# Patient Record
Sex: FEMALE | Race: WHITE | NOT HISPANIC OR LATINO
[De-identification: names, ages, dates, MRNs, and addresses within clinical notes are randomized per-mention and may not be internally consistent; named-entity substitution may affect disease eponyms.]

---

## 2019-11-12 ENCOUNTER — APPOINTMENT (OUTPATIENT)
Dept: INTERNAL MEDICINE | Facility: CLINIC | Age: 79
End: 2019-11-12
Payer: MEDICARE

## 2019-11-12 VITALS
TEMPERATURE: 97.1 F | DIASTOLIC BLOOD PRESSURE: 80 MMHG | BODY MASS INDEX: 25.87 KG/M2 | HEIGHT: 63 IN | HEART RATE: 86 BPM | WEIGHT: 146 LBS | SYSTOLIC BLOOD PRESSURE: 150 MMHG | OXYGEN SATURATION: 99 %

## 2019-11-12 VITALS — DIASTOLIC BLOOD PRESSURE: 90 MMHG | SYSTOLIC BLOOD PRESSURE: 160 MMHG

## 2019-11-12 DIAGNOSIS — R01.1 CARDIAC MURMUR, UNSPECIFIED: ICD-10-CM

## 2019-11-12 DIAGNOSIS — R68.89 OTHER GENERAL SYMPTOMS AND SIGNS: ICD-10-CM

## 2019-11-12 DIAGNOSIS — R35.1 NOCTURIA: ICD-10-CM

## 2019-11-12 DIAGNOSIS — Z82.49 FAMILY HISTORY OF ISCHEMIC HEART DISEASE AND OTHER DISEASES OF THE CIRCULATORY SYSTEM: ICD-10-CM

## 2019-11-12 DIAGNOSIS — Z00.00 ENCOUNTER FOR GENERAL ADULT MEDICAL EXAMINATION W/OUT ABNORMAL FINDINGS: ICD-10-CM

## 2019-11-12 DIAGNOSIS — R09.82 POSTNASAL DRIP: ICD-10-CM

## 2019-11-12 DIAGNOSIS — Z23 ENCOUNTER FOR IMMUNIZATION: ICD-10-CM

## 2019-11-12 DIAGNOSIS — R92.2 INCONCLUSIVE MAMMOGRAM: ICD-10-CM

## 2019-11-12 DIAGNOSIS — Z12.31 ENCOUNTER FOR SCREENING MAMMOGRAM FOR MALIGNANT NEOPLASM OF BREAST: ICD-10-CM

## 2019-11-12 DIAGNOSIS — Z01.419 ENCOUNTER FOR GYNECOLOGICAL EXAMINATION (GENERAL) (ROUTINE) W/OUT ABNORMAL FINDINGS: ICD-10-CM

## 2019-11-12 PROCEDURE — G0009: CPT

## 2019-11-12 PROCEDURE — 93000 ELECTROCARDIOGRAM COMPLETE: CPT

## 2019-11-12 PROCEDURE — 99214 OFFICE O/P EST MOD 30 MIN: CPT | Mod: 25

## 2019-11-12 PROCEDURE — 90670 PCV13 VACCINE IM: CPT

## 2019-11-12 PROCEDURE — G0438: CPT

## 2019-11-12 PROCEDURE — 36415 COLL VENOUS BLD VENIPUNCTURE: CPT

## 2019-11-12 PROCEDURE — 90472 IMMUNIZATION ADMIN EACH ADD: CPT | Mod: GY

## 2019-11-12 PROCEDURE — 90715 TDAP VACCINE 7 YRS/> IM: CPT | Mod: GY

## 2019-11-12 NOTE — PHYSICAL EXAM
[No Acute Distress] : no acute distress [Well Developed] : well developed [Well Nourished] : well nourished [Normal Sclera/Conjunctiva] : normal sclera/conjunctiva [Well-Appearing] : well-appearing [PERRL] : pupils equal round and reactive to light [Normal Outer Ear/Nose] : the outer ears and nose were normal in appearance [Normal TMs] : both tympanic membranes were normal [Normal Oropharynx] : the oropharynx was normal [Thyroid Normal, No Nodules] : the thyroid was normal and there were no nodules present [No Lymphadenopathy] : no lymphadenopathy [Supple] : supple [No Accessory Muscle Use] : no accessory muscle use [No Respiratory Distress] : no respiratory distress  [Normal S1, S2] : normal S1 and S2 [Regular Rhythm] : with a regular rhythm [Clear to Auscultation] : lungs were clear to auscultation bilaterally [Normal Rate] : normal rate  [No Carotid Bruits] : no carotid bruits [No Edema] : there was no peripheral edema [Pedal Pulses Present] : the pedal pulses are present [Non Tender] : non-tender [Soft] : abdomen soft [Non-distended] : non-distended [Normal Bowel Sounds] : normal bowel sounds [Normal Supraclavicular Nodes] : no supraclavicular lymphadenopathy [Normal Axillary Nodes] : no axillary lymphadenopathy [Normal Posterior Cervical Nodes] : no posterior cervical lymphadenopathy [Normal Inguinal Nodes] : no inguinal lymphadenopathy [Normal Anterior Cervical Nodes] : no anterior cervical lymphadenopathy [Grossly Normal Strength/Tone] : grossly normal strength/tone [No Focal Deficits] : no focal deficits [Normal Affect] : the affect was normal [Normal Gait] : normal gait [Normal Insight/Judgement] : insight and judgment were intact [Normal Appearance] : normal in appearance [No Masses] : no palpable masses [No Axillary Lymphadenopathy] : no axillary lymphadenopathy [de-identified] : murmur [de-identified] : chronically tender breast [de-identified] : heberden's nodes

## 2019-11-12 NOTE — HISTORY OF PRESENT ILLNESS
[de-identified] : vaccine- pt doesn't want flu vaccine.  pt has no vaccination rec.  rec shingrix vaccine.  pt doesn't recall last tdap vaccine\par diet-non compliant\par exercise-no\par HTN-     She  is not taking med. - last taken it 1 yr ago\par  She  has no CP or SOB.\par tinnitus- R ear- 15 yr- pt was seen by ENT]\par adopted daughter-  in MVA\par depression- pt seeing psych\par pt c/o heat intolerance\par nocturia- every 2 hr\par  pt states she was seen by DERM for wart on her scalp- pt is being tx\par balance- pt was tx w vestibular tx by ENT-  now no symptoms\par finger pain- 1 yr- no daily- worse w cold temp\par post nasal drip- sev yrs.  more last yr. no meds taken.  no itchy eyes, nose or sneezing.\par GERD- once every 2-3 wk.  started 6 mo ago.  no abd pain, dysphagia, wgt loss, melena. not taking any meds. chg from coffee to tea over past 2-3 wk.  prev drank 2 cup coffee/ day\par chol- Patient is not taking medications.  She  non compliant w diet\par \par osteopenia as per pt [FreeTextEntry1] : CPE

## 2019-11-12 NOTE — PLAN
[FreeTextEntry1] : EKG-NSR 74\par HTN- rec pt speak to her psych about going off adderall.  start norvasc\par finger pain- OA\par GERD- make life style chg and use zantac- if persistent- will do EGD

## 2019-11-12 NOTE — HEALTH RISK ASSESSMENT
[Very Good] : ~his/her~ current health as very good [Fair] :  ~his/her~ mood as fair [Yes] : Yes [Never (0 pts)] : Never (0 points) [1 or 2 (0 pts)] : 1 or 2 (0 points) [2 - 4 times a month (2 pts)] : 2-4 times a month (2 points) [No] : In the past 12 months have you used drugs other than those required for medical reasons? No [No falls in past year] : Patient reported no falls in the past year [0] : 2) Feeling down, depressed, or hopeless: Not at all (0) [Patient reported mammogram was normal] : Patient reported mammogram was normal [Patient reported colonoscopy was normal] : Patient reported colonoscopy was normal [Alone] : lives alone [HIV test declined] : HIV test declined [Fully functional (bathing, dressing, toileting, transferring, walking, feeding)] : Fully functional (bathing, dressing, toileting, transferring, walking, feeding) [] :  [Fully functional (using the telephone, shopping, preparing meals, housekeeping, doing laundry, using] : Fully functional and needs no help or supervision to perform IADLs (using the telephone, shopping, preparing meals, housekeeping, doing laundry, using transportation, managing medications and managing finances) [With Patient/Caregiver] : With Patient/Caregiver [Audit-CScore] : 2 [] : No [Reports changes in hearing] : Reports no changes in hearing [Change in mental status noted] : No change in mental status noted [MammogramDate] : 2015 [ColonoscopyDate] : 2011 [ColonoscopyComments] : pt states she was told to rpt in 10 yr- pt to release copy  [de-identified] : last seen ophth- last wk [de-identified] : pt seen dentist very frequently [AdvancecareDate] : 11/19

## 2019-11-13 ENCOUNTER — CLINICAL ADVICE (OUTPATIENT)
Age: 79
End: 2019-11-13

## 2019-11-15 ENCOUNTER — RESULT REVIEW (OUTPATIENT)
Age: 79
End: 2019-11-15

## 2019-11-15 ENCOUNTER — RESULT CHARGE (OUTPATIENT)
Age: 79
End: 2019-11-15

## 2019-11-15 LAB
25(OH)D3 SERPL-MCNC: 51.1 NG/ML
ALBUMIN SERPL ELPH-MCNC: 4.5 G/DL
ALP BLD-CCNC: 80 U/L
ALT SERPL-CCNC: 16 U/L
ANION GAP SERPL CALC-SCNC: 14 MMOL/L
APPEARANCE: ABNORMAL
AST SERPL-CCNC: 26 U/L
BACTERIA: NEGATIVE
BASOPHILS # BLD AUTO: 0.05 K/UL
BASOPHILS NFR BLD AUTO: 0.5 %
BILIRUB SERPL-MCNC: 0.2 MG/DL
BILIRUBIN URINE: NEGATIVE
BLOOD URINE: NEGATIVE
BUN SERPL-MCNC: 20 MG/DL
CALCIUM OXALATE CRYSTALS: ABNORMAL
CALCIUM SERPL-MCNC: 10.7 MG/DL
CHLORIDE SERPL-SCNC: 100 MMOL/L
CHOLEST SERPL-MCNC: 354 MG/DL
CHOLEST/HDLC SERPL: 8.2 RATIO
CO2 SERPL-SCNC: 26 MMOL/L
COLOR: ABNORMAL
CREAT SERPL-MCNC: 1.13 MG/DL
EOSINOPHIL # BLD AUTO: 0.4 K/UL
EOSINOPHIL NFR BLD AUTO: 4.2 %
ESTIMATED AVERAGE GLUCOSE: 120 MG/DL
GLUCOSE QUALITATIVE U: NEGATIVE
GLUCOSE SERPL-MCNC: 123 MG/DL
HBA1C MFR BLD HPLC: 5.8 %
HCT VFR BLD CALC: 45.9 %
HDLC SERPL-MCNC: 43 MG/DL
HGB BLD-MCNC: 13.8 G/DL
HYALINE CASTS: 0 /LPF
IMM GRANULOCYTES NFR BLD AUTO: 0.7 %
KETONES URINE: NEGATIVE
LDLC SERPL CALC-MCNC: 243 MG/DL
LEUKOCYTE ESTERASE URINE: NEGATIVE
LYMPHOCYTES # BLD AUTO: 2.44 K/UL
LYMPHOCYTES NFR BLD AUTO: 25.8 %
MAN DIFF?: NORMAL
MCHC RBC-ENTMCNC: 27.1 PG
MCHC RBC-ENTMCNC: 30.1 GM/DL
MCV RBC AUTO: 90.2 FL
MICROSCOPIC-UA: NORMAL
MONOCYTES # BLD AUTO: 0.77 K/UL
MONOCYTES NFR BLD AUTO: 8.1 %
NEUTROPHILS # BLD AUTO: 5.72 K/UL
NEUTROPHILS NFR BLD AUTO: 60.7 %
NITRITE URINE: NEGATIVE
PH URINE: 5.5
PLATELET # BLD AUTO: 283 K/UL
POTASSIUM SERPL-SCNC: 4.9 MMOL/L
PROT SERPL-MCNC: 7.8 G/DL
PROTEIN URINE: ABNORMAL
RBC # BLD: 5.09 M/UL
RBC # FLD: 14.4 %
RED BLOOD CELLS URINE: 0 /HPF
SAVE SPECIMEN: NORMAL
SODIUM SERPL-SCNC: 140 MMOL/L
SPECIFIC GRAVITY URINE: 1.03
SQUAMOUS EPITHELIAL CELLS: 1 /HPF
T3 SERPL-MCNC: 126 NG/DL
TRIGL SERPL-MCNC: 342 MG/DL
TSH SERPL-ACNC: 2.69 UIU/ML
URINE COMMENTS: NORMAL
UROBILINOGEN URINE: NORMAL
WBC # FLD AUTO: 9.45 K/UL
WHITE BLOOD CELLS URINE: 2 /HPF

## 2019-12-03 ENCOUNTER — APPOINTMENT (OUTPATIENT)
Dept: INTERNAL MEDICINE | Facility: CLINIC | Age: 79
End: 2019-12-03
Payer: MEDICARE

## 2019-12-03 ENCOUNTER — RX RENEWAL (OUTPATIENT)
Age: 79
End: 2019-12-03

## 2019-12-03 VITALS
WEIGHT: 148 LBS | TEMPERATURE: 99.4 F | BODY MASS INDEX: 26.22 KG/M2 | HEART RATE: 95 BPM | DIASTOLIC BLOOD PRESSURE: 68 MMHG | OXYGEN SATURATION: 98 % | HEIGHT: 63 IN | SYSTOLIC BLOOD PRESSURE: 146 MMHG

## 2019-12-03 VITALS — SYSTOLIC BLOOD PRESSURE: 140 MMHG | DIASTOLIC BLOOD PRESSURE: 80 MMHG

## 2019-12-03 DIAGNOSIS — K80.20 CALCULUS OF GALLBLADDER W/OUT CHOLECYSTITIS W/OUT OBSTRUCTION: ICD-10-CM

## 2019-12-03 DIAGNOSIS — K76.0 FATTY (CHANGE OF) LIVER, NOT ELSEWHERE CLASSIFIED: ICD-10-CM

## 2019-12-03 DIAGNOSIS — Z23 ENCOUNTER FOR IMMUNIZATION: ICD-10-CM

## 2019-12-03 DIAGNOSIS — K21.9 GASTRO-ESOPHAGEAL REFLUX DISEASE W/OUT ESOPHAGITIS: ICD-10-CM

## 2019-12-03 DIAGNOSIS — N64.4 MASTODYNIA: ICD-10-CM

## 2019-12-03 DIAGNOSIS — R73.09 OTHER ABNORMAL GLUCOSE: ICD-10-CM

## 2019-12-03 PROCEDURE — 36415 COLL VENOUS BLD VENIPUNCTURE: CPT

## 2019-12-03 PROCEDURE — 99214 OFFICE O/P EST MOD 30 MIN: CPT | Mod: 25

## 2019-12-03 RX ORDER — DEXTROAMPHETAMINE SACCHARATE, AMPHETAMINE ASPARTATE, DEXTROAMPHETAMINE SULFATE AND AMPHETAMINE SULFATE 5; 5; 5; 5 MG/1; MG/1; MG/1; MG/1
20 TABLET ORAL
Qty: 60 | Refills: 0 | Status: DISCONTINUED | COMMUNITY
Start: 2019-06-05 | End: 2019-12-03

## 2019-12-03 RX ORDER — AMLODIPINE BESYLATE 5 MG/1
5 TABLET ORAL
Qty: 30 | Refills: 0 | Status: DISCONTINUED | COMMUNITY
Start: 2019-11-12 | End: 2019-12-03

## 2019-12-03 NOTE — PLAN
[FreeTextEntry1] : lipid- rec starting statin- SE of meds discussed.  low chol diet reviewed w pt.  pt states she had myalgia w statin prev but pt doesn't know which statin she is used in the past.\par \par HTN- incr norvasc 2.5mg\par IGT- diet reviewed w pt.\par pt to f/u w appt

## 2019-12-03 NOTE — REVIEW OF SYSTEMS
[Chest Pain] : no chest pain [Shortness Of Breath] : no shortness of breath [FreeTextEntry1] : see hPi

## 2019-12-03 NOTE — HISTORY OF PRESENT ILLNESS
[FreeTextEntry1] : HTN, chol [de-identified] : HTN- last visit norvasc was started. rec pt speak to psych to dec alderal- pt instead stopped taking the medication and feels better.    She  is  compliant with med. \par   Patient is not compliant with diet. Ms. KENNEY  exercises []. \par She  has no CP or SOB.\par \par GERD-pt didn't start rantidine. pt gets symptoms once a mo\par IGT- reviewed diet\par Mastalgia- pt was told to f/u w GYN, mammo, US\par lipid- reviewed diet and tx w pt\par

## 2019-12-04 LAB
CREAT SPEC-SCNC: 287 MG/DL
CREAT/PROT UR: 0.2 RATIO
PROT UR-MCNC: 42 MG/DL

## 2019-12-11 ENCOUNTER — APPOINTMENT (OUTPATIENT)
Dept: INTERNAL MEDICINE | Facility: CLINIC | Age: 79
End: 2019-12-11
Payer: MEDICARE

## 2019-12-11 PROCEDURE — 77080 DXA BONE DENSITY AXIAL: CPT | Mod: GA

## 2019-12-25 ENCOUNTER — TRANSCRIPTION ENCOUNTER (OUTPATIENT)
Age: 79
End: 2019-12-25

## 2020-01-02 ENCOUNTER — RX RENEWAL (OUTPATIENT)
Age: 80
End: 2020-01-02

## 2020-01-07 ENCOUNTER — APPOINTMENT (OUTPATIENT)
Dept: INTERNAL MEDICINE | Facility: CLINIC | Age: 80
End: 2020-01-07
Payer: MEDICARE

## 2020-01-07 ENCOUNTER — RX RENEWAL (OUTPATIENT)
Age: 80
End: 2020-01-07

## 2020-01-07 VITALS
WEIGHT: 146 LBS | HEART RATE: 94 BPM | TEMPERATURE: 98.3 F | SYSTOLIC BLOOD PRESSURE: 150 MMHG | BODY MASS INDEX: 25.87 KG/M2 | DIASTOLIC BLOOD PRESSURE: 74 MMHG | OXYGEN SATURATION: 98 % | HEIGHT: 63 IN

## 2020-01-07 VITALS — SYSTOLIC BLOOD PRESSURE: 142 MMHG | DIASTOLIC BLOOD PRESSURE: 76 MMHG

## 2020-01-07 VITALS — DIASTOLIC BLOOD PRESSURE: 78 MMHG | SYSTOLIC BLOOD PRESSURE: 148 MMHG

## 2020-01-07 DIAGNOSIS — R07.89 OTHER CHEST PAIN: ICD-10-CM

## 2020-01-07 PROCEDURE — 93000 ELECTROCARDIOGRAM COMPLETE: CPT

## 2020-01-07 PROCEDURE — 99214 OFFICE O/P EST MOD 30 MIN: CPT | Mod: 25

## 2020-01-07 PROCEDURE — 36415 COLL VENOUS BLD VENIPUNCTURE: CPT

## 2020-01-07 RX ORDER — AMLODIPINE BESYLATE 2.5 MG/1
2.5 TABLET ORAL DAILY
Qty: 30 | Refills: 0 | Status: DISCONTINUED | COMMUNITY
Start: 2019-12-03 | End: 2020-01-07

## 2020-01-07 RX ORDER — AMLODIPINE BESYLATE 5 MG/1
5 TABLET ORAL
Qty: 30 | Refills: 0 | Status: DISCONTINUED | COMMUNITY
Start: 2019-12-03 | End: 2020-01-07

## 2020-01-07 NOTE — PLAN
[FreeTextEntry1] : EKG-0 NSR 83.  no chg.  \par diet reviewed. \par non fasting labs. \par HTN- incr norvasc.

## 2020-01-07 NOTE — HISTORY OF PRESENT ILLNESS
[FreeTextEntry1] : chol [de-identified] : HTN-     She  is  compliant with med. \par   Patient is compliant with diet. Ms. KENNEY doesn't exercises [].   norvasc was incr last visit. \par She  has no SOB. \par CP on rest-  when laying down.  3-4 episodes- started 1 mo ago.  last episode - 1 wk.  duration less than 1 min.  no dyspnea. mild dull CP.  no alleviating or aggrav factors. \par   no GERD symptoms\par \par chol- Patient is compliant with diet and medications.  She  has no abdominal pain,  and myalgia\par crestor was started last visit. \par IGT- diet discussed\par pt c/o dec motivation.  occasional sad.  no hopelessness\par

## 2020-01-09 LAB
ALBUMIN SERPL ELPH-MCNC: 4.7 G/DL
ALP BLD-CCNC: 66 U/L
ALT SERPL-CCNC: 16 U/L
ANION GAP SERPL CALC-SCNC: 13 MMOL/L
AST SERPL-CCNC: 24 U/L
BILIRUB SERPL-MCNC: 0.2 MG/DL
BUN SERPL-MCNC: 21 MG/DL
CALCIUM SERPL-MCNC: 10.4 MG/DL
CHLORIDE SERPL-SCNC: 102 MMOL/L
CO2 SERPL-SCNC: 25 MMOL/L
CREAT SERPL-MCNC: 1.05 MG/DL
ESTIMATED AVERAGE GLUCOSE: 123 MG/DL
GLUCOSE SERPL-MCNC: 117 MG/DL
HBA1C MFR BLD HPLC: 5.9 %
POTASSIUM SERPL-SCNC: 4.5 MMOL/L
PROT SERPL-MCNC: 8 G/DL
SODIUM SERPL-SCNC: 140 MMOL/L

## 2020-02-05 ENCOUNTER — RX RENEWAL (OUTPATIENT)
Age: 80
End: 2020-02-05

## 2020-02-05 ENCOUNTER — APPOINTMENT (OUTPATIENT)
Dept: INTERNAL MEDICINE | Facility: CLINIC | Age: 80
End: 2020-02-05
Payer: MEDICARE

## 2020-02-05 VITALS
SYSTOLIC BLOOD PRESSURE: 150 MMHG | HEIGHT: 63 IN | OXYGEN SATURATION: 98 % | BODY MASS INDEX: 25.87 KG/M2 | TEMPERATURE: 98.1 F | WEIGHT: 146 LBS | HEART RATE: 80 BPM | DIASTOLIC BLOOD PRESSURE: 70 MMHG

## 2020-02-05 DIAGNOSIS — M81.0 AGE-RELATED OSTEOPOROSIS W/OUT CURRENT PATHOLOGICAL FRACTURE: ICD-10-CM

## 2020-02-05 DIAGNOSIS — Z86.39 PERSONAL HISTORY OF OTHER ENDOCRINE, NUTRITIONAL AND METABOLIC DISEASE: ICD-10-CM

## 2020-02-05 PROCEDURE — 99214 OFFICE O/P EST MOD 30 MIN: CPT | Mod: 25

## 2020-02-05 PROCEDURE — 93306 TTE W/DOPPLER COMPLETE: CPT | Mod: TC

## 2020-02-05 PROCEDURE — 93306 TTE W/DOPPLER COMPLETE: CPT | Mod: 26

## 2020-02-05 RX ORDER — HYDROCHLOROTHIAZIDE 12.5 MG/1
12.5 CAPSULE ORAL
Qty: 30 | Refills: 0 | Status: DISCONTINUED | COMMUNITY
Start: 2020-02-05 | End: 2020-02-05

## 2020-02-05 RX ORDER — DICLOFENAC SODIUM 10 MG/G
1 GEL TOPICAL DAILY
Qty: 1 | Refills: 3 | Status: ACTIVE | COMMUNITY
Start: 2020-02-05 | End: 1900-01-01

## 2020-02-05 NOTE — HISTORY OF PRESENT ILLNESS
[FreeTextEntry1] : HTN [de-identified] : HTN-     She  ist  compliant with med. incr norvasc last visit.  pt has more HA over past few wk\par   Patient is compliant with diet. Ms. KENNEY  ordered an exercise bike\par She  has no CP or SOB.\par CHOL- pt stopped statin b/c she started abt.  Patient is compliant with diet.  She  has no abdominal pain,  \par has shoulder pain- started 2 wk ago- constant daily pain. moderate dull pain\par better w aspercream.  worse w lifting or putting on her coat or jacket.\par no limitation of ROM.  no weakness or numbness of the UExt.  no trauma \par prelim ECHOmild AI, AS, MR, TR, EF 67%\par osteopenia- discussed Ca, vit D supplement\par \par \par

## 2020-02-05 NOTE — PLAN
[FreeTextEntry1] : chol- hold crestor for another wk and see if shoulder painresolved\par shoulder pain- heat.  avoid heavy lifting. try voltaren\par HTN- dec norvasc  and add lisinopril\par rec shingrix vac\par osteopenia- Ca, vit D, exercise

## 2020-02-05 NOTE — PHYSICAL EXAM
[de-identified] : MS 5/5 UExt.  good ROM of b/l shoulder- pain at 90 deg flexion. mild discomfort on internal rotation

## 2020-03-06 ENCOUNTER — RX RENEWAL (OUTPATIENT)
Age: 80
End: 2020-03-06

## 2020-03-06 ENCOUNTER — APPOINTMENT (OUTPATIENT)
Dept: INTERNAL MEDICINE | Facility: CLINIC | Age: 80
End: 2020-03-06
Payer: MEDICARE

## 2020-03-06 VITALS
HEART RATE: 90 BPM | OXYGEN SATURATION: 98 % | WEIGHT: 148 LBS | TEMPERATURE: 98.1 F | HEIGHT: 63 IN | DIASTOLIC BLOOD PRESSURE: 70 MMHG | BODY MASS INDEX: 26.22 KG/M2 | SYSTOLIC BLOOD PRESSURE: 160 MMHG

## 2020-03-06 VITALS — SYSTOLIC BLOOD PRESSURE: 160 MMHG | DIASTOLIC BLOOD PRESSURE: 80 MMHG

## 2020-03-06 DIAGNOSIS — M25.512 PAIN IN RIGHT SHOULDER: ICD-10-CM

## 2020-03-06 DIAGNOSIS — I35.0 NONRHEUMATIC AORTIC (VALVE) STENOSIS: ICD-10-CM

## 2020-03-06 DIAGNOSIS — M25.511 PAIN IN RIGHT SHOULDER: ICD-10-CM

## 2020-03-06 LAB
ANION GAP SERPL CALC-SCNC: 13 MMOL/L
BUN SERPL-MCNC: 17 MG/DL
CALCIUM SERPL-MCNC: 10.1 MG/DL
CHLORIDE SERPL-SCNC: 100 MMOL/L
CO2 SERPL-SCNC: 27 MMOL/L
CREAT SERPL-MCNC: 0.99 MG/DL
GLUCOSE SERPL-MCNC: 110 MG/DL
POTASSIUM SERPL-SCNC: 4.6 MMOL/L
SODIUM SERPL-SCNC: 139 MMOL/L

## 2020-03-06 PROCEDURE — 99214 OFFICE O/P EST MOD 30 MIN: CPT | Mod: 25

## 2020-03-06 PROCEDURE — 36415 COLL VENOUS BLD VENIPUNCTURE: CPT

## 2020-03-06 RX ORDER — CLARITHROMYCIN 500 MG/1
500 TABLET, FILM COATED ORAL
Qty: 14 | Refills: 0 | Status: DISCONTINUED | COMMUNITY
Start: 2019-06-27 | End: 2020-03-06

## 2020-03-06 RX ORDER — RANITIDINE 150 MG/1
150 TABLET, FILM COATED ORAL
Qty: 60 | Refills: 0 | Status: DISCONTINUED | COMMUNITY
Start: 2019-11-12 | End: 2020-03-06

## 2020-03-06 RX ORDER — CHLORHEXIDINE GLUCONATE, 0.12% ORAL RINSE 1.2 MG/ML
0.12 SOLUTION DENTAL
Qty: 473 | Refills: 0 | Status: DISCONTINUED | COMMUNITY
Start: 2019-06-27 | End: 2020-03-06

## 2020-03-06 NOTE — HISTORY OF PRESENT ILLNESS
[FreeTextEntry1] : HTN [de-identified] : HTN-     She  is  compliant with med. - norvasc was decr.  added lisinopril last visit\par   Patient is compliant with diet. Ms. KENNEY  exercises -no\par She  has no CP or SOB.\par \par CHol- Patient stopped statin due to shoulder pain.  She  has no abdominal pain,  and myalgia\par \par \par shoulder pain- resolved after stopping statin 1 wk

## 2020-03-10 ENCOUNTER — RX RENEWAL (OUTPATIENT)
Age: 80
End: 2020-03-10

## 2020-03-10 RX ORDER — AMLODIPINE BESYLATE 5 MG/1
5 TABLET ORAL
Qty: 90 | Refills: 0 | Status: ACTIVE | COMMUNITY
Start: 2020-02-05 | End: 1900-01-01

## 2020-03-30 ENCOUNTER — APPOINTMENT (OUTPATIENT)
Dept: INTERNAL MEDICINE | Facility: CLINIC | Age: 80
End: 2020-03-30

## 2020-03-31 ENCOUNTER — RX RENEWAL (OUTPATIENT)
Age: 80
End: 2020-03-31

## 2020-05-28 ENCOUNTER — RX RENEWAL (OUTPATIENT)
Age: 80
End: 2020-05-28

## 2020-06-01 ENCOUNTER — APPOINTMENT (OUTPATIENT)
Dept: INTERNAL MEDICINE | Facility: CLINIC | Age: 80
End: 2020-06-01
Payer: MEDICARE

## 2020-06-01 PROCEDURE — 99442: CPT | Mod: 95

## 2020-06-01 RX ORDER — LISINOPRIL 5 MG/1
5 TABLET ORAL DAILY
Qty: 30 | Refills: 0 | Status: DISCONTINUED | COMMUNITY
Start: 2020-02-05 | End: 2020-06-01

## 2020-06-01 RX ORDER — TRIAMCINOLONE ACETONIDE 55 UG/1
55 SPRAY, METERED NASAL
Qty: 1 | Refills: 0 | Status: DISCONTINUED | COMMUNITY
Start: 2019-11-12 | End: 2020-06-01

## 2020-06-01 RX ORDER — CANE TIPS
EACH MISCELLANEOUS
Qty: 1 | Refills: 0 | Status: ACTIVE | COMMUNITY
Start: 2020-06-01 | End: 1900-01-01

## 2020-06-01 NOTE — ASSESSMENT
[FreeTextEntry1] : HTN=-  pt will get BP machine and send me reading\par pt to try to walk daily.\par 15   minutes minimal was spent with patient and >50% of the time spent in the encounter involved counseling and coordination of care for any and all diagnosis submitted.\par \par

## 2020-06-01 NOTE — HISTORY OF PRESENT ILLNESS
[FreeTextEntry1] : HTN [de-identified] : HTN- last visit incr lisinoprill.  pt is compliant w low salt diet. no CP or SOB.  pt doesn't have home BP monitor.  compliant w meds. no CP or  SOB.\par chol- restarted crestor last visit- no myalgia, abd pain or fatigue.  compliant w diet , meds.

## 2020-07-06 ENCOUNTER — RX RENEWAL (OUTPATIENT)
Age: 80
End: 2020-07-06

## 2020-07-08 ENCOUNTER — LABORATORY RESULT (OUTPATIENT)
Age: 80
End: 2020-07-08

## 2020-07-17 ENCOUNTER — RX RENEWAL (OUTPATIENT)
Age: 80
End: 2020-07-17

## 2020-07-18 ENCOUNTER — APPOINTMENT (OUTPATIENT)
Dept: INTERNAL MEDICINE | Facility: CLINIC | Age: 80
End: 2020-07-18
Payer: MEDICARE

## 2020-07-18 VITALS
HEART RATE: 76 BPM | SYSTOLIC BLOOD PRESSURE: 122 MMHG | WEIGHT: 156 LBS | DIASTOLIC BLOOD PRESSURE: 64 MMHG | OXYGEN SATURATION: 98 % | BODY MASS INDEX: 27.64 KG/M2 | TEMPERATURE: 98.3 F | HEIGHT: 63 IN

## 2020-07-18 PROCEDURE — 36415 COLL VENOUS BLD VENIPUNCTURE: CPT

## 2020-07-18 PROCEDURE — 99214 OFFICE O/P EST MOD 30 MIN: CPT | Mod: 25

## 2020-07-23 ENCOUNTER — APPOINTMENT (OUTPATIENT)
Dept: INTERNAL MEDICINE | Facility: CLINIC | Age: 80
End: 2020-07-23
Payer: MEDICARE

## 2020-07-23 DIAGNOSIS — G47.00 INSOMNIA, UNSPECIFIED: ICD-10-CM

## 2020-07-23 LAB
ALBUMIN SERPL ELPH-MCNC: 4.6 G/DL
ALP BLD-CCNC: 65 U/L
ALT SERPL-CCNC: 21 U/L
ANION GAP SERPL CALC-SCNC: 15 MMOL/L
AST SERPL-CCNC: 25 U/L
BASOPHILS # BLD AUTO: 0.05 K/UL
BASOPHILS NFR BLD AUTO: 0.6 %
BILIRUB SERPL-MCNC: 0.3 MG/DL
BUN SERPL-MCNC: 17 MG/DL
CALCIUM SERPL-MCNC: 10 MG/DL
CHLORIDE SERPL-SCNC: 103 MMOL/L
CO2 SERPL-SCNC: 25 MMOL/L
CREAT SERPL-MCNC: 1.01 MG/DL
EOSINOPHIL # BLD AUTO: 0.44 K/UL
EOSINOPHIL NFR BLD AUTO: 4.9 %
ESTIMATED AVERAGE GLUCOSE: 123 MG/DL
GLUCOSE SERPL-MCNC: 114 MG/DL
HBA1C MFR BLD HPLC: 5.9 %
HCT VFR BLD CALC: 42.7 %
HGB BLD-MCNC: 13.3 G/DL
IMM GRANULOCYTES NFR BLD AUTO: 0.7 %
LYMPHOCYTES # BLD AUTO: 2.66 K/UL
LYMPHOCYTES NFR BLD AUTO: 29.6 %
MAN DIFF?: NORMAL
MCHC RBC-ENTMCNC: 29 PG
MCHC RBC-ENTMCNC: 31.1 GM/DL
MCV RBC AUTO: 93 FL
MONOCYTES # BLD AUTO: 0.65 K/UL
MONOCYTES NFR BLD AUTO: 7.2 %
NEUTROPHILS # BLD AUTO: 5.14 K/UL
NEUTROPHILS NFR BLD AUTO: 57 %
PLATELET # BLD AUTO: 286 K/UL
POTASSIUM SERPL-SCNC: 5 MMOL/L
PROT SERPL-MCNC: 7.6 G/DL
RBC # BLD: 4.59 M/UL
RBC # FLD: 13.2 %
SAVE SPECIMEN: NORMAL
SODIUM SERPL-SCNC: 143 MMOL/L
WBC # FLD AUTO: 9 K/UL

## 2020-07-23 PROCEDURE — 99441: CPT | Mod: 95

## 2020-08-22 ENCOUNTER — APPOINTMENT (OUTPATIENT)
Dept: INTERNAL MEDICINE | Facility: CLINIC | Age: 80
End: 2020-08-22
Payer: MEDICARE

## 2020-08-22 VITALS
OXYGEN SATURATION: 95 % | WEIGHT: 158 LBS | DIASTOLIC BLOOD PRESSURE: 76 MMHG | HEART RATE: 104 BPM | SYSTOLIC BLOOD PRESSURE: 140 MMHG | BODY MASS INDEX: 28 KG/M2 | HEIGHT: 63 IN | TEMPERATURE: 98.3 F

## 2020-08-22 DIAGNOSIS — E53.8 DEFICIENCY OF OTHER SPECIFIED B GROUP VITAMINS: ICD-10-CM

## 2020-08-22 DIAGNOSIS — R80.9 PROTEINURIA, UNSPECIFIED: ICD-10-CM

## 2020-08-22 LAB
BILIRUB UR QL STRIP: NORMAL
CLARITY UR: NORMAL
GLUCOSE UR-MCNC: NEGATIVE
HCG UR QL: 0.2 EU/DL
HGB UR QL STRIP.AUTO: NEGATIVE
KETONES UR-MCNC: NEGATIVE
LEUKOCYTE ESTERASE UR QL STRIP: NEGATIVE
NITRITE UR QL STRIP: NEGATIVE
PH UR STRIP: 5
PROT UR STRIP-MCNC: 30
SP GR UR STRIP: 1.03

## 2020-08-22 PROCEDURE — 81002 URINALYSIS NONAUTO W/O SCOPE: CPT

## 2020-08-22 PROCEDURE — 36415 COLL VENOUS BLD VENIPUNCTURE: CPT

## 2020-08-22 PROCEDURE — 99214 OFFICE O/P EST MOD 30 MIN: CPT | Mod: 25

## 2020-08-23 ENCOUNTER — RX RENEWAL (OUTPATIENT)
Age: 80
End: 2020-08-23

## 2020-08-28 ENCOUNTER — APPOINTMENT (OUTPATIENT)
Dept: INTERNAL MEDICINE | Facility: CLINIC | Age: 80
End: 2020-08-28
Payer: MEDICARE

## 2020-08-28 DIAGNOSIS — Z09 ENCOUNTER FOR FOLLOW-UP EXAMINATION AFTER COMPLETED TREATMENT FOR CONDITIONS OTHER THAN MALIGNANT NEOPLASM: ICD-10-CM

## 2020-08-28 LAB
ALBUMIN SERPL ELPH-MCNC: 5 G/DL
ALP BLD-CCNC: 73 U/L
ALT SERPL-CCNC: 23 U/L
ANION GAP SERPL CALC-SCNC: 17 MMOL/L
AST SERPL-CCNC: 23 U/L
BILIRUB SERPL-MCNC: 0.4 MG/DL
BUN SERPL-MCNC: 24 MG/DL
CALCIUM SERPL-MCNC: 10.1 MG/DL
CHLORIDE SERPL-SCNC: 102 MMOL/L
CO2 SERPL-SCNC: 24 MMOL/L
CREAT SERPL-MCNC: 1.53 MG/DL
ESTIMATED AVERAGE GLUCOSE: 126 MG/DL
FOLATE SERPL-MCNC: >20 NG/ML
GLUCOSE SERPL-MCNC: 132 MG/DL
HBA1C MFR BLD HPLC: 6 %
POTASSIUM SERPL-SCNC: 4.5 MMOL/L
PROT SERPL-MCNC: 7.6 G/DL
SODIUM SERPL-SCNC: 143 MMOL/L
VIT B12 SERPL-MCNC: 929 PG/ML

## 2020-08-28 PROCEDURE — 99442: CPT | Mod: 95

## 2020-10-12 ENCOUNTER — RX RENEWAL (OUTPATIENT)
Age: 80
End: 2020-10-12

## 2020-10-29 ENCOUNTER — RX RENEWAL (OUTPATIENT)
Age: 80
End: 2020-10-29

## 2020-12-04 ENCOUNTER — RX RENEWAL (OUTPATIENT)
Age: 80
End: 2020-12-04

## 2021-01-11 ENCOUNTER — RX RENEWAL (OUTPATIENT)
Age: 81
End: 2021-01-11

## 2021-01-15 ENCOUNTER — TRANSCRIPTION ENCOUNTER (OUTPATIENT)
Age: 81
End: 2021-01-15

## 2021-01-15 DIAGNOSIS — E55.9 VITAMIN D DEFICIENCY, UNSPECIFIED: ICD-10-CM

## 2021-01-15 DIAGNOSIS — M85.80 OTHER SPECIFIED DISORDERS OF BONE DENSITY AND STRUCTURE, UNSPECIFIED SITE: ICD-10-CM

## 2021-01-15 DIAGNOSIS — R73.02 IMPAIRED GLUCOSE TOLERANCE (ORAL): ICD-10-CM

## 2021-01-17 ENCOUNTER — RX RENEWAL (OUTPATIENT)
Age: 81
End: 2021-01-17

## 2021-01-18 ENCOUNTER — LABORATORY RESULT (OUTPATIENT)
Age: 81
End: 2021-01-18

## 2021-01-18 ENCOUNTER — APPOINTMENT (OUTPATIENT)
Dept: INTERNAL MEDICINE | Facility: CLINIC | Age: 81
End: 2021-01-18
Payer: MEDICARE

## 2021-01-18 PROCEDURE — 36415 COLL VENOUS BLD VENIPUNCTURE: CPT

## 2021-01-30 ENCOUNTER — APPOINTMENT (OUTPATIENT)
Dept: INTERNAL MEDICINE | Facility: CLINIC | Age: 81
End: 2021-01-30
Payer: MEDICARE

## 2021-01-30 VITALS
WEIGHT: 158 LBS | DIASTOLIC BLOOD PRESSURE: 70 MMHG | SYSTOLIC BLOOD PRESSURE: 140 MMHG | BODY MASS INDEX: 28 KG/M2 | HEIGHT: 63 IN | TEMPERATURE: 97.1 F

## 2021-01-30 DIAGNOSIS — I10 ESSENTIAL (PRIMARY) HYPERTENSION: ICD-10-CM

## 2021-01-30 DIAGNOSIS — R93.1 ABNORMAL FINDINGS ON DIAGNOSTIC IMAGING OF HEART AND CORONARY CIRCULATION: ICD-10-CM

## 2021-01-30 DIAGNOSIS — E78.5 HYPERLIPIDEMIA, UNSPECIFIED: ICD-10-CM

## 2021-01-30 PROCEDURE — 99213 OFFICE O/P EST LOW 20 MIN: CPT

## 2021-02-01 PROBLEM — E78.5 HYPERLIPIDEMIA: Status: ACTIVE | Noted: 2019-12-03

## 2021-02-01 PROBLEM — R93.1 ABNORMAL ECHOCARDIOGRAM: Status: ACTIVE | Noted: 2020-02-19

## 2021-02-01 NOTE — HISTORY OF PRESENT ILLNESS
[de-identified] : Feels that  anti anxiety medications do not work enough. Still anxious taking them. \par But no c/p,palp.SOB.

## 2021-02-01 NOTE — PHYSICAL EXAM
[No Acute Distress] : no acute distress [Well Nourished] : well nourished [Well Developed] : well developed [Well-Appearing] : well-appearing [Normal Sclera/Conjunctiva] : normal sclera/conjunctiva [PERRL] : pupils equal round and reactive to light [EOMI] : extraocular movements intact [Normal Outer Ear/Nose] : the outer ears and nose were normal in appearance [Normal Oropharynx] : the oropharynx was normal [No JVD] : no jugular venous distention [No Lymphadenopathy] : no lymphadenopathy [Supple] : supple [Thyroid Normal, No Nodules] : the thyroid was normal and there were no nodules present [No Respiratory Distress] : no respiratory distress  [No Accessory Muscle Use] : no accessory muscle use [Clear to Auscultation] : lungs were clear to auscultation bilaterally [Normal Rate] : normal rate  [Regular Rhythm] : with a regular rhythm [Normal S1, S2] : normal S1 and S2 [No Murmur] : no murmur heard [No Carotid Bruits] : no carotid bruits [No Abdominal Bruit] : a ~M bruit was not heard ~T in the abdomen [No Varicosities] : no varicosities [Pedal Pulses Present] : the pedal pulses are present [No Edema] : there was no peripheral edema [No Palpable Aorta] : no palpable aorta [No Extremity Clubbing/Cyanosis] : no extremity clubbing/cyanosis [Soft] : abdomen soft [Non Tender] : non-tender [Non-distended] : non-distended [No HSM] : no HSM [No Masses] : no abdominal mass palpated [Normal Bowel Sounds] : normal bowel sounds [Normal Posterior Cervical Nodes] : no posterior cervical lymphadenopathy [No CVA Tenderness] : no CVA  tenderness [Normal Anterior Cervical Nodes] : no anterior cervical lymphadenopathy [No Spinal Tenderness] : no spinal tenderness [No Joint Swelling] : no joint swelling [Grossly Normal Strength/Tone] : grossly normal strength/tone [No Rash] : no rash [Coordination Grossly Intact] : coordination grossly intact [No Focal Deficits] : no focal deficits [Normal Gait] : normal gait [Deep Tendon Reflexes (DTR)] : deep tendon reflexes were 2+ and symmetric [Normal Affect] : the affect was normal [Normal Insight/Judgement] : insight and judgment were intact [Alert and Oriented x3] : oriented to person, place, and time [Normal Mood] : the mood was normal

## 2021-02-08 ENCOUNTER — RX RENEWAL (OUTPATIENT)
Age: 81
End: 2021-02-08

## 2021-03-12 ENCOUNTER — APPOINTMENT (OUTPATIENT)
Dept: INTERNAL MEDICINE | Facility: CLINIC | Age: 81
End: 2021-03-12
Payer: MEDICARE

## 2021-03-12 VITALS
HEIGHT: 63 IN | WEIGHT: 158 LBS | DIASTOLIC BLOOD PRESSURE: 68 MMHG | BODY MASS INDEX: 28 KG/M2 | SYSTOLIC BLOOD PRESSURE: 155 MMHG

## 2021-03-12 DIAGNOSIS — E66.3 OVERWEIGHT: ICD-10-CM

## 2021-03-12 PROCEDURE — 36415 COLL VENOUS BLD VENIPUNCTURE: CPT

## 2021-03-12 PROCEDURE — 99213 OFFICE O/P EST LOW 20 MIN: CPT | Mod: 25

## 2021-03-13 PROBLEM — E66.3 OVERWEIGHT: Status: ACTIVE | Noted: 2019-11-12

## 2021-03-13 NOTE — HISTORY OF PRESENT ILLNESS
[de-identified] : Pt presents for f/u prediabetes, kidney failure,anxiety.\par Pt reports feeling better on Prozac but still not enough. Denies panic attacks.\par Denies c/p,palp.SOB.

## 2021-03-13 NOTE — PHYSICAL EXAM
[No Acute Distress] : no acute distress [Well Nourished] : well nourished [Well Developed] : well developed [No Respiratory Distress] : no respiratory distress  [No Accessory Muscle Use] : no accessory muscle use [Clear to Auscultation] : lungs were clear to auscultation bilaterally [Normal Rate] : normal rate  [Regular Rhythm] : with a regular rhythm [Normal S1, S2] : normal S1 and S2 [Alert and Oriented x3] : oriented to person, place, and time [Normal Mood] : the mood was normal

## 2021-03-17 LAB
ALBUMIN SERPL ELPH-MCNC: 4.8 G/DL
ALP BLD-CCNC: 78 U/L
ALT SERPL-CCNC: 16 U/L
ANION GAP SERPL CALC-SCNC: 15 MMOL/L
AST SERPL-CCNC: 21 U/L
BASOPHILS # BLD AUTO: 0.07 K/UL
BASOPHILS NFR BLD AUTO: 0.6 %
BILIRUB SERPL-MCNC: 0.2 MG/DL
BUN SERPL-MCNC: 17 MG/DL
CALCIUM SERPL-MCNC: 10.4 MG/DL
CHLORIDE SERPL-SCNC: 101 MMOL/L
CO2 SERPL-SCNC: 25 MMOL/L
CREAT SERPL-MCNC: 0.87 MG/DL
EOSINOPHIL # BLD AUTO: 0.34 K/UL
EOSINOPHIL NFR BLD AUTO: 3.1 %
ESTIMATED AVERAGE GLUCOSE: 120 MG/DL
GLUCOSE SERPL-MCNC: 124 MG/DL
HBA1C MFR BLD HPLC: 5.8 %
HCT VFR BLD CALC: 43 %
HGB BLD-MCNC: 12.9 G/DL
IMM GRANULOCYTES NFR BLD AUTO: 0.9 %
LYMPHOCYTES # BLD AUTO: 3.3 K/UL
LYMPHOCYTES NFR BLD AUTO: 30.5 %
MAN DIFF?: NORMAL
MCHC RBC-ENTMCNC: 28.5 PG
MCHC RBC-ENTMCNC: 30 GM/DL
MCV RBC AUTO: 95.1 FL
MONOCYTES # BLD AUTO: 0.79 K/UL
MONOCYTES NFR BLD AUTO: 7.3 %
NEUTROPHILS # BLD AUTO: 6.21 K/UL
NEUTROPHILS NFR BLD AUTO: 57.6 %
PLATELET # BLD AUTO: 345 K/UL
POTASSIUM SERPL-SCNC: 5 MMOL/L
PROT SERPL-MCNC: 7.6 G/DL
RBC # BLD: 4.52 M/UL
RBC # FLD: 13.5 %
SAVE SPECIMEN: NORMAL
SODIUM SERPL-SCNC: 141 MMOL/L
WBC # FLD AUTO: 10.81 K/UL

## 2021-05-26 ENCOUNTER — RX RENEWAL (OUTPATIENT)
Age: 81
End: 2021-05-26

## 2021-05-26 RX ORDER — ROSUVASTATIN CALCIUM 5 MG/1
5 TABLET, FILM COATED ORAL
Qty: 90 | Refills: 3 | Status: ACTIVE | COMMUNITY
Start: 2019-12-03 | End: 1900-01-01

## 2021-06-16 ENCOUNTER — APPOINTMENT (OUTPATIENT)
Dept: INTERNAL MEDICINE | Facility: CLINIC | Age: 81
End: 2021-06-16

## 2021-09-07 ENCOUNTER — RX RENEWAL (OUTPATIENT)
Age: 81
End: 2021-09-07

## 2021-10-06 ENCOUNTER — RX RENEWAL (OUTPATIENT)
Age: 81
End: 2021-10-06

## 2021-11-09 ENCOUNTER — RX RENEWAL (OUTPATIENT)
Age: 81
End: 2021-11-09

## 2021-11-09 RX ORDER — FLUOXETINE HYDROCHLORIDE 10 MG/1
10 CAPSULE ORAL
Qty: 30 | Refills: 6 | Status: ACTIVE | COMMUNITY
Start: 2021-09-07 | End: 1900-01-01

## 2021-11-12 ENCOUNTER — APPOINTMENT (OUTPATIENT)
Dept: INTERNAL MEDICINE | Facility: CLINIC | Age: 81
End: 2021-11-12
Payer: MEDICARE

## 2021-11-12 VITALS
SYSTOLIC BLOOD PRESSURE: 140 MMHG | WEIGHT: 148 LBS | HEIGHT: 63 IN | BODY MASS INDEX: 26.22 KG/M2 | OXYGEN SATURATION: 97 % | HEART RATE: 68 BPM | DIASTOLIC BLOOD PRESSURE: 70 MMHG | TEMPERATURE: 97.9 F

## 2021-11-12 DIAGNOSIS — F41.9 ANXIETY DISORDER, UNSPECIFIED: ICD-10-CM

## 2021-11-12 DIAGNOSIS — R79.89 OTHER SPECIFIED ABNORMAL FINDINGS OF BLOOD CHEMISTRY: ICD-10-CM

## 2021-11-12 DIAGNOSIS — R73.09 OTHER ABNORMAL GLUCOSE: ICD-10-CM

## 2021-11-12 DIAGNOSIS — R94.4 ABNORMAL RESULTS OF KIDNEY FUNCTION STUDIES: ICD-10-CM

## 2021-11-12 PROCEDURE — 99214 OFFICE O/P EST MOD 30 MIN: CPT | Mod: 25

## 2021-11-12 PROCEDURE — 36415 COLL VENOUS BLD VENIPUNCTURE: CPT

## 2021-11-13 PROBLEM — R94.4 DECREASED GFR: Status: ACTIVE | Noted: 2020-07-18

## 2021-11-13 PROBLEM — R73.09 ELEVATED GLUCOSE: Status: ACTIVE | Noted: 2021-03-12

## 2021-11-13 PROBLEM — R79.89 CREATININE ELEVATION: Status: ACTIVE | Noted: 2020-07-18

## 2021-11-13 NOTE — PHYSICAL EXAM
[No Acute Distress] : no acute distress [Well Nourished] : well nourished [Well Developed] : well developed [No JVD] : no jugular venous distention [No Lymphadenopathy] : no lymphadenopathy [Thyroid Normal, No Nodules] : the thyroid was normal and there were no nodules present [No Respiratory Distress] : no respiratory distress  [No Accessory Muscle Use] : no accessory muscle use [Clear to Auscultation] : lungs were clear to auscultation bilaterally [Normal Rate] : normal rate  [Regular Rhythm] : with a regular rhythm [Normal S1, S2] : normal S1 and S2 [Pedal Pulses Present] : the pedal pulses are present [No Edema] : there was no peripheral edema [de-identified] : appears neat and nice

## 2021-11-13 NOTE — HISTORY OF PRESENT ILLNESS
[FreeTextEntry8] : Pt is always late for her laurence.\par Feeling very anxious. The anxiety level is progressively worse. Not able to focus and concentrate on daily routines.Appetite is good.\par But no c/p,palp.SOB. Denies suicidal ideation.

## 2021-11-20 LAB
ALBUMIN SERPL ELPH-MCNC: 4.7 G/DL
ALP BLD-CCNC: 79 U/L
ALT SERPL-CCNC: 23 U/L
ANION GAP SERPL CALC-SCNC: 17 MMOL/L
AST SERPL-CCNC: 30 U/L
BASOPHILS # BLD AUTO: 0.07 K/UL
BASOPHILS NFR BLD AUTO: 0.7 %
BILIRUB SERPL-MCNC: 0.3 MG/DL
BUN SERPL-MCNC: 21 MG/DL
CALCIUM SERPL-MCNC: 10.1 MG/DL
CHLORIDE SERPL-SCNC: 101 MMOL/L
CO2 SERPL-SCNC: 22 MMOL/L
CREAT SERPL-MCNC: 0.9 MG/DL
EOSINOPHIL # BLD AUTO: 0.31 K/UL
EOSINOPHIL NFR BLD AUTO: 3.3 %
ESTIMATED AVERAGE GLUCOSE: 117 MG/DL
GLUCOSE SERPL-MCNC: 109 MG/DL
HBA1C MFR BLD HPLC: 5.7 %
HCT VFR BLD CALC: 43.6 %
HGB BLD-MCNC: 13.9 G/DL
IMM GRANULOCYTES NFR BLD AUTO: 1 %
LYMPHOCYTES # BLD AUTO: 2.35 K/UL
LYMPHOCYTES NFR BLD AUTO: 24.9 %
MAN DIFF?: NORMAL
MCHC RBC-ENTMCNC: 29.7 PG
MCHC RBC-ENTMCNC: 31.9 GM/DL
MCV RBC AUTO: 93.2 FL
MONOCYTES # BLD AUTO: 0.74 K/UL
MONOCYTES NFR BLD AUTO: 7.8 %
NEUTROPHILS # BLD AUTO: 5.89 K/UL
NEUTROPHILS NFR BLD AUTO: 62.3 %
PLATELET # BLD AUTO: 367 K/UL
POTASSIUM SERPL-SCNC: 4.9 MMOL/L
PROT SERPL-MCNC: 7.9 G/DL
RBC # BLD: 4.68 M/UL
RBC # FLD: 13.3 %
SODIUM SERPL-SCNC: 140 MMOL/L
TSH SERPL-ACNC: 1.51 UIU/ML
WBC # FLD AUTO: 9.45 K/UL

## 2022-01-20 ENCOUNTER — RX RENEWAL (OUTPATIENT)
Age: 82
End: 2022-01-20

## 2022-02-10 ENCOUNTER — RX RENEWAL (OUTPATIENT)
Age: 82
End: 2022-02-10

## 2022-04-18 ENCOUNTER — RX RENEWAL (OUTPATIENT)
Age: 82
End: 2022-04-18

## 2023-01-08 ENCOUNTER — RX RENEWAL (OUTPATIENT)
Age: 83
End: 2023-01-08

## 2023-01-10 ENCOUNTER — RX RENEWAL (OUTPATIENT)
Age: 83
End: 2023-01-10

## 2023-01-10 RX ORDER — AMLODIPINE BESYLATE 10 MG/1
10 TABLET ORAL
Qty: 90 | Refills: 3 | Status: ACTIVE | COMMUNITY
Start: 2020-01-07 | End: 1900-01-01

## 2023-01-29 ENCOUNTER — RX RENEWAL (OUTPATIENT)
Age: 83
End: 2023-01-29

## 2023-02-01 ENCOUNTER — RX RENEWAL (OUTPATIENT)
Age: 83
End: 2023-02-01

## 2023-02-01 RX ORDER — LISINOPRIL 10 MG/1
10 TABLET ORAL
Qty: 90 | Refills: 3 | Status: ACTIVE | COMMUNITY
Start: 2020-03-06 | End: 1900-01-01

## 2023-04-27 ENCOUNTER — RX RENEWAL (OUTPATIENT)
Age: 83
End: 2023-04-27

## 2023-04-27 RX ORDER — FLUOXETINE HYDROCHLORIDE 20 MG/1
20 CAPSULE ORAL
Qty: 90 | Refills: 3 | Status: ACTIVE | COMMUNITY
Start: 2021-01-30 | End: 1900-01-01

## 2023-04-27 RX ORDER — VENLAFAXINE HYDROCHLORIDE 75 MG/1
75 CAPSULE, EXTENDED RELEASE ORAL
Qty: 180 | Refills: 2 | Status: ACTIVE | COMMUNITY
Start: 2019-10-22 | End: 1900-01-01

## 2023-04-28 ENCOUNTER — RX RENEWAL (OUTPATIENT)
Age: 83
End: 2023-04-28

## 2023-08-07 ENCOUNTER — EMERGENCY (EMERGENCY)
Facility: HOSPITAL | Age: 83
LOS: 1 days | Discharge: ANOTHER HOSPITAL | End: 2023-08-07
Attending: EMERGENCY MEDICINE | Admitting: EMERGENCY MEDICINE
Payer: MEDICARE

## 2023-08-07 ENCOUNTER — EMERGENCY (EMERGENCY)
Facility: HOSPITAL | Age: 83
LOS: 1 days | Discharge: HOME | End: 2023-08-07
Attending: EMERGENCY MEDICINE | Admitting: EMERGENCY MEDICINE
Payer: MEDICARE

## 2023-08-07 VITALS
SYSTOLIC BLOOD PRESSURE: 167 MMHG | OXYGEN SATURATION: 99 % | DIASTOLIC BLOOD PRESSURE: 77 MMHG | HEART RATE: 77 BPM | TEMPERATURE: 98 F | RESPIRATION RATE: 16 BRPM

## 2023-08-07 VITALS
HEIGHT: 63 IN | RESPIRATION RATE: 16 BRPM | DIASTOLIC BLOOD PRESSURE: 71 MMHG | SYSTOLIC BLOOD PRESSURE: 153 MMHG | WEIGHT: 175.05 LBS | HEART RATE: 108 BPM | TEMPERATURE: 98 F | OXYGEN SATURATION: 100 %

## 2023-08-07 VITALS
HEART RATE: 78 BPM | TEMPERATURE: 98 F | RESPIRATION RATE: 16 BRPM | WEIGHT: 149.91 LBS | DIASTOLIC BLOOD PRESSURE: 69 MMHG | HEIGHT: 63 IN | OXYGEN SATURATION: 98 % | SYSTOLIC BLOOD PRESSURE: 158 MMHG

## 2023-08-07 LAB
ALBUMIN SERPL ELPH-MCNC: 3.6 G/DL — SIGNIFICANT CHANGE UP (ref 3.3–5)
ALP SERPL-CCNC: 61 U/L — SIGNIFICANT CHANGE UP (ref 30–120)
ALT FLD-CCNC: 25 U/L DA — SIGNIFICANT CHANGE UP (ref 10–60)
ANION GAP SERPL CALC-SCNC: 7 MMOL/L — SIGNIFICANT CHANGE UP (ref 5–17)
APTT BLD: 32.1 SEC — SIGNIFICANT CHANGE UP (ref 24.5–35.6)
AST SERPL-CCNC: 21 U/L — SIGNIFICANT CHANGE UP (ref 10–40)
BASOPHILS # BLD AUTO: 0.06 K/UL — SIGNIFICANT CHANGE UP (ref 0–0.2)
BASOPHILS NFR BLD AUTO: 0.4 % — SIGNIFICANT CHANGE UP (ref 0–2)
BILIRUB SERPL-MCNC: 0.2 MG/DL — SIGNIFICANT CHANGE UP (ref 0.2–1.2)
BUN SERPL-MCNC: 19 MG/DL — SIGNIFICANT CHANGE UP (ref 7–23)
CALCIUM SERPL-MCNC: 9.5 MG/DL — SIGNIFICANT CHANGE UP (ref 8.4–10.5)
CHLORIDE SERPL-SCNC: 100 MMOL/L — SIGNIFICANT CHANGE UP (ref 96–108)
CO2 SERPL-SCNC: 30 MMOL/L — SIGNIFICANT CHANGE UP (ref 22–31)
CREAT SERPL-MCNC: 0.85 MG/DL — SIGNIFICANT CHANGE UP (ref 0.5–1.3)
EGFR: 69 ML/MIN/1.73M2 — SIGNIFICANT CHANGE UP
EOSINOPHIL # BLD AUTO: 0.25 K/UL — SIGNIFICANT CHANGE UP (ref 0–0.5)
EOSINOPHIL NFR BLD AUTO: 1.7 % — SIGNIFICANT CHANGE UP (ref 0–6)
GLUCOSE SERPL-MCNC: 137 MG/DL — HIGH (ref 70–99)
HCT VFR BLD CALC: 27 % — LOW (ref 34.5–45)
HGB BLD-MCNC: 7.7 G/DL — LOW (ref 11.5–15.5)
IMM GRANULOCYTES NFR BLD AUTO: 1.2 % — HIGH (ref 0–0.9)
INR BLD: 1.03 RATIO — SIGNIFICANT CHANGE UP (ref 0.85–1.18)
LYMPHOCYTES # BLD AUTO: 1.32 K/UL — SIGNIFICANT CHANGE UP (ref 1–3.3)
LYMPHOCYTES # BLD AUTO: 9.2 % — LOW (ref 13–44)
MCHC RBC-ENTMCNC: 22.3 PG — LOW (ref 27–34)
MCHC RBC-ENTMCNC: 28.5 GM/DL — LOW (ref 32–36)
MCV RBC AUTO: 78.3 FL — LOW (ref 80–100)
MONOCYTES # BLD AUTO: 0.76 K/UL — SIGNIFICANT CHANGE UP (ref 0–0.9)
MONOCYTES NFR BLD AUTO: 5.3 % — SIGNIFICANT CHANGE UP (ref 2–14)
NEUTROPHILS # BLD AUTO: 11.79 K/UL — HIGH (ref 1.8–7.4)
NEUTROPHILS NFR BLD AUTO: 82.2 % — HIGH (ref 43–77)
NRBC # BLD: 0 /100 WBCS — SIGNIFICANT CHANGE UP (ref 0–0)
PLATELET # BLD AUTO: 393 K/UL — SIGNIFICANT CHANGE UP (ref 150–400)
POTASSIUM SERPL-MCNC: 4.4 MMOL/L — SIGNIFICANT CHANGE UP (ref 3.5–5.3)
POTASSIUM SERPL-SCNC: 4.4 MMOL/L — SIGNIFICANT CHANGE UP (ref 3.5–5.3)
PROT SERPL-MCNC: 7.7 G/DL — SIGNIFICANT CHANGE UP (ref 6–8.3)
PROTHROM AB SERPL-ACNC: 11.5 SEC — SIGNIFICANT CHANGE UP (ref 9.5–13)
RBC # BLD: 3.45 M/UL — LOW (ref 3.8–5.2)
RBC # FLD: 16.2 % — HIGH (ref 10.3–14.5)
SODIUM SERPL-SCNC: 137 MMOL/L — SIGNIFICANT CHANGE UP (ref 135–145)
WBC # BLD: 14.35 K/UL — HIGH (ref 3.8–10.5)
WBC # FLD AUTO: 14.35 K/UL — HIGH (ref 3.8–10.5)

## 2023-08-07 PROCEDURE — 99285 EMERGENCY DEPT VISIT HI MDM: CPT

## 2023-08-07 PROCEDURE — 85025 COMPLETE CBC W/AUTO DIFF WBC: CPT

## 2023-08-07 PROCEDURE — 36415 COLL VENOUS BLD VENIPUNCTURE: CPT

## 2023-08-07 PROCEDURE — 71045 X-RAY EXAM CHEST 1 VIEW: CPT | Mod: 26

## 2023-08-07 PROCEDURE — 99223 1ST HOSP IP/OBS HIGH 75: CPT

## 2023-08-07 PROCEDURE — 85730 THROMBOPLASTIN TIME PARTIAL: CPT

## 2023-08-07 PROCEDURE — 72125 CT NECK SPINE W/O DYE: CPT | Mod: 26,MA

## 2023-08-07 PROCEDURE — 80053 COMPREHEN METABOLIC PANEL: CPT

## 2023-08-07 PROCEDURE — 70450 CT HEAD/BRAIN W/O DYE: CPT | Mod: 26,MA

## 2023-08-07 PROCEDURE — 70450 CT HEAD/BRAIN W/O DYE: CPT | Mod: MA

## 2023-08-07 PROCEDURE — 99222 1ST HOSP IP/OBS MODERATE 55: CPT

## 2023-08-07 PROCEDURE — 72125 CT NECK SPINE W/O DYE: CPT | Mod: MA

## 2023-08-07 PROCEDURE — 99285 EMERGENCY DEPT VISIT HI MDM: CPT | Mod: 25

## 2023-08-07 PROCEDURE — 85610 PROTHROMBIN TIME: CPT

## 2023-08-07 RX ORDER — LISINOPRIL 2.5 MG/1
10 TABLET ORAL DAILY
Refills: 0 | Status: DISCONTINUED | OUTPATIENT
Start: 2023-08-07 | End: 2023-08-14

## 2023-08-07 RX ORDER — ACETAMINOPHEN 500 MG
650 TABLET ORAL ONCE
Refills: 0 | Status: COMPLETED | OUTPATIENT
Start: 2023-08-07 | End: 2023-08-07

## 2023-08-07 RX ORDER — AMLODIPINE BESYLATE 2.5 MG/1
10 TABLET ORAL DAILY
Refills: 0 | Status: DISCONTINUED | OUTPATIENT
Start: 2023-08-07 | End: 2023-08-14

## 2023-08-07 RX ORDER — KETOROLAC TROMETHAMINE 30 MG/ML
15 SYRINGE (ML) INJECTION ONCE
Refills: 0 | Status: DISCONTINUED | OUTPATIENT
Start: 2023-08-07 | End: 2023-08-07

## 2023-08-07 RX ORDER — FLUOXETINE HCL 10 MG
20 CAPSULE ORAL ONCE
Refills: 0 | Status: COMPLETED | OUTPATIENT
Start: 2023-08-07 | End: 2023-08-07

## 2023-08-07 RX ORDER — VENLAFAXINE HCL 75 MG
75 CAPSULE, EXT RELEASE 24 HR ORAL
Refills: 0 | Status: DISCONTINUED | OUTPATIENT
Start: 2023-08-07 | End: 2023-08-14

## 2023-08-07 RX ORDER — ACETAMINOPHEN 500 MG
650 TABLET ORAL EVERY 6 HOURS
Refills: 0 | Status: DISCONTINUED | OUTPATIENT
Start: 2023-08-07 | End: 2023-08-14

## 2023-08-07 RX ORDER — ATORVASTATIN CALCIUM 80 MG/1
40 TABLET, FILM COATED ORAL AT BEDTIME
Refills: 0 | Status: DISCONTINUED | OUTPATIENT
Start: 2023-08-07 | End: 2023-08-14

## 2023-08-07 RX ADMIN — Medication 650 MILLIGRAM(S): at 09:36

## 2023-08-07 RX ADMIN — Medication 650 MILLIGRAM(S): at 16:32

## 2023-08-07 RX ADMIN — Medication 15 MILLIGRAM(S): at 16:32

## 2023-08-07 RX ADMIN — Medication 20 MILLIGRAM(S): at 23:48

## 2023-08-07 RX ADMIN — Medication 650 MILLIGRAM(S): at 16:36

## 2023-08-07 RX ADMIN — Medication 650 MILLIGRAM(S): at 09:06

## 2023-08-07 RX ADMIN — ATORVASTATIN CALCIUM 40 MILLIGRAM(S): 80 TABLET, FILM COATED ORAL at 23:45

## 2023-08-07 RX ADMIN — Medication 15 MILLIGRAM(S): at 16:36

## 2023-08-07 RX ADMIN — AMLODIPINE BESYLATE 10 MILLIGRAM(S): 2.5 TABLET ORAL at 23:45

## 2023-08-07 RX ADMIN — Medication 75 MILLIGRAM(S): at 23:45

## 2023-08-07 RX ADMIN — LISINOPRIL 10 MILLIGRAM(S): 2.5 TABLET ORAL at 23:49

## 2023-08-07 NOTE — ED ADULT TRIAGE NOTE - CHIEF COMPLAINT QUOTE
pt transferred from [place] s/p fall out of bed this AM. found to have c5 fracture. right eye ecchymotic, c-collar placed PTA.

## 2023-08-07 NOTE — ED PROVIDER NOTE - CARE PLAN
1 Principal Discharge DX:	Injury of head and neck   Principal Discharge DX:	Injury of head and neck  Secondary Diagnosis:	C5 cervical fracture

## 2023-08-07 NOTE — ED CDU PROVIDER INITIAL DAY NOTE - CLINICAL SUMMARY MEDICAL DECISION MAKING FREE TEXT BOX
ASSESSMENT:   NEETU KENNEY is a 84yo F who presented as transfer s/p fall w/ C5 fracture. Vitals stable. Physical exam non contributory.     PLAN:   -C-collar at all times, MRI C-spine w/o, pain control. Trauma eval.

## 2023-08-07 NOTE — CONSULT NOTE ADULT - ASSESSMENT
A: 84 yo F with a hx of HTN, HLD, depression who presents to the ED as a transfer from [place], s/p fall from bed. +HS, no LOC. Injury complex below. NVI    Injury complex:   1. C5 fracture   2. Age-indeterminate nasal bone fx     Plan:   - NSGY consultation, follow up final recs  - MRI C-spine ordered by ED  - Follow up CxR and Pelvic XR   - Ice packs to face for comfort   - Trauma will follow pending films     Plan discussed with Trauma Attending. 
ASSESSMENT:   83yF PMHx of HTN and HLD presents as transfer from [place] after falling face first off her bed after sitting up quickly. CT C-spine showed C5 acute anterior inferior corner/osteophyte fx.     PLAN:    -C-collar at all times, orthotist to deliver better fitting collar to bedside  -MRI C-spine w/o  -Pain control prn  -Trauma eval  -Further neurosurgical plan pending MRI results  -Discussed case with Dr. Rodriguez

## 2023-08-07 NOTE — ED ADULT NURSE NOTE - NSFALLRISKINTERV_ED_ALL_ED

## 2023-08-07 NOTE — ED ADULT NURSE NOTE - OBJECTIVE STATEMENT
Pt transferred from [place] s/p fall this morning. Pt is resting comfortably in stretcher. NAD. Pt is A&Ox3. Respirations are even and unlabored. Color is appropriate for race. Pt appears to have ecchymosis to right eye and swollen forehead. Pt is applying ice to area. Pt has PIV and c-collar PTA. Plan of care on-going.

## 2023-08-07 NOTE — ED CDU PROVIDER INITIAL DAY NOTE - OBJECTIVE STATEMENT
Patient brought in by EMS for head neck pain and forehead hematoma status post fall out of bed.  Patient denies LOC dizziness nausea vomiting weakness numbness chest pain short of breath abdominal pain arm pain leg pain back pain or any other complaints.  Patient ambulated after fall.  Patient relates pain improved when placed in c collar by ems.

## 2023-08-07 NOTE — ED ADULT NURSE NOTE - CAS ELECT INFOMATION PROVIDED
Pt evaluated by MD, treated as ordered, cleared for discharge. Pt ambulated out of the ED in NAD and without medical devices.

## 2023-08-07 NOTE — ED PROVIDER NOTE - ENMT, MLM
Airway patent, Nasal mucosa clear. Mouth with normal mucosa. + right sided forehead hematoma. no facial bone tenderness

## 2023-08-07 NOTE — ED PROVIDER NOTE - NSFOLLOWUPINSTRUCTIONS_ED_ALL_ED_FT
HEAD INJURY - AfterCare(R) Instructions(ER/ED)    Head Injury    WHAT YOU NEED TO KNOW:    A head injury can include your scalp, face, skull, or brain and range from mild to severe. Effects can appear immediately after the injury or develop later. The effects may last a short time or be permanent. Healthcare providers may want to check your recovery over time. Treatment may change as you recover or develop new health problems from the head injury.    DISCHARGE INSTRUCTIONS:    Call your local emergency number (911 in the [place]), or have someone else call if:    You cannot be woken.    You have a seizure.    You stop responding to others or you faint.    You have blurry or double vision.    Your speech becomes slurred or confused.    You have arm or leg weakness, loss of feeling, or new problems with coordination.    Your pupils are larger than usual, or one pupil is a different size than the other.    You have blood or clear fluid coming out of your ears or nose.  Return to the emergency department if:    You have repeated or forceful vomiting.    You feel confused.    Your headache gets worse or becomes severe.    You or someone caring for you notices that you are harder to wake than usual.  Call your doctor if:    Your symptoms last longer than 6 weeks after the injury.    You have questions or concerns about your condition or care.  Medicines:    Acetaminophen decreases pain and fever. It is available without a doctor's order. Ask how much to take and how often to take it. Follow directions. Read the labels of all other medicines you are using to see if they also contain acetaminophen, or ask your doctor or pharmacist. Acetaminophen can cause liver damage if not taken correctly.    Take your medicine as directed. Contact your healthcare provider if you think your medicine is not helping or if you have side effects. Tell your provider if you are allergic to any medicine. Keep a list of the medicines, vitamins, and herbs you take. Include the amounts, and when and why you take them. Bring the list or the pill bottles to follow-up visits. Carry your medicine list with you in case of an emergency.  Self-care:    Rest or do quiet activities. Limit your time watching TV, using the computer, or doing tasks that require a lot of thinking. Slowly return to your normal activities as directed. Do not play sports or do activities that may cause you to get hit in the head. Ask your healthcare provider when you can return to sports.    Apply ice on your head for 15 to 20 minutes every hour or as directed. Use an ice pack, or put crushed ice in a plastic bag. Cover it with a towel before you apply it to your skin. Ice helps prevent tissue damage and decreases swelling and pain.    Have someone stay with you for 24 hours , or as directed. This person can monitor you for problems and call for help if needed. When you are awake, the person should ask you a few questions every few hours to see if you are thinking clearly. An example is to ask your name or address.  Prevent another head injury:    Wear a helmet that fits properly. Do this when you play sports, or ride a bike, scooter, or skateboard. Helmets help decrease your risk for a serious head injury. Talk to your healthcare provider about other ways you can protect yourself if you play sports.    Wear your seatbelt every time you are in a car. This helps lower your risk for a head injury if you are in a car accident.  Follow up with your doctor as directed: Write down your questions so you remember to ask them during your visits.      ACUTE NECK PAIN - AfterCare(R) Instructions(ER/ED)    Acute Neck Pain    WHAT YOU NEED TO KNOW:    Acute neck pain starts suddenly, increases quickly, and goes away in a few days. The pain may come and go, or be worse with certain movements. The pain may be only in your neck, or it may move to your arms, back, or shoulders. You may also have pain that starts in another body area and moves to your neck.  Vertebral Column    DISCHARGE INSTRUCTIONS:    Return to the emergency department if:    You have an injury that causes neck pain and shooting pain down your arms or legs.    Your neck pain suddenly becomes severe.    You have neck pain along with numbness, tingling, or weakness in your arms or legs.    You have a stiff neck, a headache, and a fever.  Call your doctor if:    You have new or worsening symptoms.    Your symptoms continue even after treatment.    You have questions or concerns about your condition or care.  Medicines: You may need any of the following:    NSAIDs, such as ibuprofen, help decrease swelling, pain, and fever. This medicine is available with or without a doctor's order. NSAIDs can cause stomach bleeding or kidney problems in certain people. If you take blood thinner medicine, always ask your healthcare provider if NSAIDs are safe for you. Always read the medicine label and follow directions.    Acetaminophen decreases pain and fever. It is available without a doctor's order. Ask how much to take and how often to take it. Follow directions. Read the labels of all other medicines you are using to see if they also contain acetaminophen, or ask your doctor or pharmacist. Acetaminophen can cause liver damage if not taken correctly.    Steroid medicine may be used to reduce inflammation. This can help relieve pain caused by swelling.    Muscle relaxers help relax tense muscles and can prevent muscle spasms.    Nerve medicine may be given if your pain is caused by a nerve problem.    Take your medicine as directed. Contact your healthcare provider if you think your medicine is not helping or if you have side effects. Tell your provider if you are allergic to any medicine. Keep a list of the medicines, vitamins, and herbs you take. Include the amounts, and when and why you take them. Bring the list or the pill bottles to follow-up visits. Carry your medicine list with you in case of an emergency.  Manage or prevent acute neck pain:    Rest your neck as directed. Do not make sudden movements, such as turning your head quickly. Your healthcare provider may recommend you wear a cervical collar for a short time. The collar will prevent you from moving your head. This will give your neck time to heal if an injury is causing your neck pain. Ask your healthcare provider when you can return to sports or other normal daily activities.  Cervical Collars      Apply heat as directed. Heat helps relieve pain and swelling. Use a heat wrap, or soak a small towel in warm water. Wring out the extra water. Apply the heat wrap or towel for 20 minutes every hour, or as directed.    Apply ice as directed. Ice helps relieve pain and swelling, and can help prevent tissue damage. Use an ice pack, or put ice in a bag. Cover the ice pack or back with a towel before you apply it to your neck. Apply the ice pack or ice for 15 minutes every hour, or as directed. Your healthcare provider can tell you how often to apply ice.    Do neck exercises as directed. Neck exercises help strengthen the muscles and increase range of motion. Your healthcare provider will tell you which exercises are right for you. He or she may give you instructions or recommend that you work with a physical therapist. Your healthcare provider or therapist can make sure you are doing the exercises correctly.    Maintain good posture. Try to keep your head and shoulders lifted when you sit. If you work in front of a computer, make sure the monitor is at the right level. You should not need to look up down to see the screen. You should also not have to lean forward to be able to read what is on the screen. Make sure your keyboard, mouse, and other computer items are placed where you do not have to extend your shoulder to reach them. Get up often if you work in front of a computer or sit for long periods of time. Stretch or walk around to keep your neck muscles loose.  Proper [employer]gonom[employer]  Follow up with your doctor as directed: He or she may refer you to a specialist if your pain does not get better with treatment. Write down your questions so you remember to ask them during your visits.

## 2023-08-07 NOTE — ED PROVIDER NOTE - CLINICAL SUMMARY MEDICAL DECISION MAKING FREE TEXT BOX
83-year-old female presents for a C5 fracture after mechanical fall out of bed this morning.  Patient neurovascularly intact.  Right periorbital hematoma improving with ice.  Patient is pain-free at this time.  Neurosurgery and trauma consulted.  MRI ordered.

## 2023-08-07 NOTE — ED ADULT TRIAGE NOTE - CHIEF COMPLAINT QUOTE
" I rolled out of bed and hit my head onto the concrete floor " Pt denies any LOC , dizziness - Pt is not on any blood thinners (+) Contusion right side forehead

## 2023-08-07 NOTE — ED PROVIDER NOTE - PHYSICAL EXAMINATION
Const: Awake, alert and oriented. In no acute distress. Well appearing.  HEENT: Right periorbital hematoma, no sun sign, no epistaxis, no septal hematoma, no intraoral lacerations or bleeding, no tongue lacerations or abrasions.  Eyes: No scleral icterus. EOMI.  Neck:. Cervical collar in place. No midline TTP. No palpable step offs.  Chest: Chest wall stable, no flail chest, no crepitus on palpation.  Cardiac: Regular rate and regular rhythm. +S1/S2. No murmurs. 2+ Central pulses and symmetric. Peripheral pulses 2+ and symmetric. No LE edema.  Resp: Speaking in full sentences. No evidence of respiratory distress. No wheezes, rales or rhonchi.  Abd: Soft, non-tender, non-distended. Normal bowel sounds in all 4 quadrants. No guarding or rebound.  MSK: Pelvis stable. No obvious deformity.  Back: Spine midline and non-tender. No palpable step offs.  Skin: No rashes, abrasions or lacerations.  Neuro: Awake, alert & oriented x 3. GCS 15. Withdraws to pain symmetrically. Follows commands. 5/5 strength symmetrically all extremities.

## 2023-08-07 NOTE — ED ADULT NURSE NOTE - CHIEF COMPLAINT QUOTE
pt transferred from [place] s/p fall out of bed this AM. found to have c5 fracture. right eye ecchymotic, c-collar placed PTA.

## 2023-08-07 NOTE — ED PROVIDER NOTE - OBJECTIVE STATEMENT
82 y/o F presents as transfer from [place] for mechanical fall out of bed this morning, striking her head on the floor, no LOC, notes immediate swelling to the right eye and epistaxis. She presented to [place] ED, had CT head/C/S and was found to have a C5 fracture. She was placed in a cervical collar, treated with tylenol and states she feels great. Denies pain/numbness/weakness. She has no other injuries at this time, denies blood thinners. She has been applying ice to her right eye and now can open it. Denies vision changes. Epistaxis has resolved.

## 2023-08-07 NOTE — CONSULT NOTE ADULT - SUBJECTIVE AND OBJECTIVE BOX
HISTORY OF PRESENT ILLNESS:   83yF PMHx of HTN and HLD presents as transfer from [place] after falling face first off her bed after sitting up quickly. Denies any LOC. Obvious scalp hematoma and b/l periorbital edema. Denies any neck pain. Reports her only pain is in her forehead. Denies any extremity weakness, numbness/tingling. CT C-spine showed C5 acute anterior inferior corner/osteophyte fx. Transferred from [place] since unable to get MRI there.      PAST MEDICAL & SURGICAL HISTORY:  HTN (hypertension)    HLD        FAMILY HISTORY:      SOCIAL HISTORY:  Tobacco Use:  EtOH use:   Substance:    Allergies    sulfa drugs (Hives)  penicillin (Hives)  clindamycin (Hives)    Intolerances        REVIEW OF SYSTEMS  Negative except as noted in HPI    HOME MEDICATIONS:  Home Medications:      MEDICATIONS:  Antibiotics:    Neuro:    Anticoagulation:    OTHER:    IVF:      Vital Signs Last 24 Hrs  T(C): 36.7 (07 Aug 2023 11:21), Max: 36.7 (07 Aug 2023 08:36)  T(F): 98 (07 Aug 2023 11:21), Max: 98.1 (07 Aug 2023 08:36)  HR: 108 (07 Aug 2023 11:21) (77 - 108)  BP: 153/71 (07 Aug 2023 11:21) (153/71 - 167/77)  BP(mean): --  RR: 16 (07 Aug 2023 11:21) (16 - 16)  SpO2: 100% (07 Aug 2023 11:21) (98% - 100%)    Parameters below as of 07 Aug 2023 11:21  Patient On (Oxygen Delivery Method): room air          PHYSICAL EXAM:  GENERAL: NAD, well-groomed  HEAD:  Large hematoma on R side of forehead  EYES: Conjunctiva and sclera clear; b/l periorbital edema and ecchymosis R>L  ENMT: Good dentition, has AMADO dental implant that fell out; ecchymosis of nasion  NECK: In c-collar, Nontender to palpation, no midline tenderness  MERY COMA SCORE: E- V- M- = 15       E: 4= opens eyes spontaneously 3= to voice 2= to noxious 1= no opening       V: 5= oriented 4= confused 3= inappropriate words 2= incomprehensible sounds 1= nonverbal 1T= intubated       M: 6= follows commands 5= localizes 4= withdraws 3= flexor posturing 2= extensor posturing 1= no movement  MENTAL STATUS: AAO x3; Awake; Opens eyes spontaneously; Appropriately conversant without aphasia; following simple commands  CRANIAL NERVES: Visual acuity normal for age, visual fields full to confrontation, PERRL. EOMI without nystagmus. Facial sensation intact V1-3 distribution b/l. Face symmetric w/ normal eye closure and smile, tongue midline. Hearing grossly intact. Speech clear. Shoulder shrug intact.   REFLEXES: PERRL. Negative Talbot's b/l. Negative clonus b/l  MOTOR: strength 5/5 b/l upper and lower extremities  SENSATION: grossly intact to light touch all extremities  COORDINATION: Gait testing deferred  CHEST/LUNG: Nonlabored on room air  SKIN: Warm, dry    LABS:                        7.7    14.35 )-----------( 393      ( 07 Aug 2023 10:26 )             27.0     08-07    137  |  100  |  19  ----------------------------<  137<H>  4.4   |  30  |  0.85    Ca    9.5      07 Aug 2023 10:26    TPro  7.7  /  Alb  3.6  /  TBili  0.2  /  DBili  x   /  AST  21  /  ALT  25  /  AlkPhos  61  08-07    PT/INR - ( 07 Aug 2023 10:26 )   PT: 11.5 sec;   INR: 1.03 ratio         PTT - ( 07 Aug 2023 10:26 )  PTT:32.1 sec  Urinalysis Basic - ( 07 Aug 2023 10:26 )    Color: x / Appearance: x / SG: x / pH: x  Gluc: 137 mg/dL / Ketone: x  / Bili: x / Urobili: x   Blood: x / Protein: x / Nitrite: x   Leuk Esterase: x / RBC: x / WBC x   Sq Epi: x / Non Sq Epi: x / Bacteria: x      RADIOLOGY & ADDITIONAL STUDIES:    CT Cervical Spine No Cont (08.07.23 @ 09:22)   IMPRESSION:    CT head: Mild chronic microvascular changes without evidence of an acute   transcortical infarction or hemorrhage. Large midline frontal scalp   hematoma.    CT C-spine: Acute anterior inferior corner/osteophyte fracture at the C5   level. Findings discussed with Dr. Womack. Consider MRI as clinically   warranted.      
                                                                           Trauma   Surgery Consult    Consulting attending: Dr. Miller       HPI: Ms. Mishra is a 82 yo F with a hx of HTN, HLD, depression who presents to the ED as a transfer from [place]. Patient states that she was lying in bed, when she had post-nasal drip and sat up to blow her nose. In an attempt to reach, patient fell forward from her bed onto her face. +HS, no LOC. Patient was able to ambulate after fall. She noted facial swelling. CT head negative for any acute ICH (? age -indeterminate nasal bone fracture). C -spine demonstrates a  C5/osteophyte fracture. Patient transferred to [facility] for NSGY evaluation. On arrival to patient is GCS 15. Tachycardic, otherwise HDS. She denies any numbness/tingling, weakness of ext, fever/chills, CP and/or SOB. Ecchymosis of R eye and swelling that improved with ice packs. Denies any HA, changes in vision, N&V.         PAST MEDICAL HISTORY:  HTN (hypertension)  HLD  Depression         MEDICATIONS:        ALLERGIES:  sulfa drugs (Hives)  penicillin (Hives)  clindamycin (Hives)        VITALS & I/Os:  Vital Signs Last 24 Hrs  T(C): 36.7 (07 Aug 2023 11:21), Max: 36.7 (07 Aug 2023 08:36)  T(F): 98 (07 Aug 2023 11:21), Max: 98.1 (07 Aug 2023 08:36)  HR: 108 (07 Aug 2023 11:21) (77 - 108)  BP: 153/71 (07 Aug 2023 11:21) (153/71 - 167/77)  BP(mean): --  RR: 16 (07 Aug 2023 11:21) (16 - 16)  SpO2: 100% (07 Aug 2023 11:21) (98% - 100%)    Parameters below as of 07 Aug 2023 11:21  Patient On (Oxygen Delivery Method): room air        I&O's Summary        PHYSICAL EXAM:  Constitutional: patient resting comfortably in bed, in no acute distress  HEENT: EOMI / PERRL b/l, ecchymosis/swelling of R eye, no septal hematoma   Neck: No JVD, C-sollar in place, no cervical spine tenderness   Respiratory: respirations are unlabored, no accessory muscle use, no conversational dyspnea, no chest wall pain   Gastrointestinal: Abdomen soft, non-tender, non-distended, no rebound tenderness / guarding  Neurological: GCS: 15 (4/5/6). A&O x 3; no gross sensory / motor / coordination deficits, neurointact   Musculoskeletal: No joint pain, swelling or deformity; no limitation of movement, no T/L spinal pain       LABS:                        7.7    14.35 )-----------( 393      ( 07 Aug 2023 10:26 )             27.0     08-07    137  |  100  |  19  ----------------------------<  137<H>  4.4   |  30  |  0.85    Ca    9.5      07 Aug 2023 10:26    TPro  7.7  /  Alb  3.6  /  TBili  0.2  /  DBili  x   /  AST  21  /  ALT  25  /  AlkPhos  61  08-07    Lactate:    PT/INR - ( 07 Aug 2023 10:26 )   PT: 11.5 sec;   INR: 1.03 ratio         PTT - ( 07 Aug 2023 10:26 )  PTT:32.1 sec          Urinalysis Basic - ( 07 Aug 2023 10:26 )    Color: x / Appearance: x / SG: x / pH: x  Gluc: 137 mg/dL / Ketone: x  / Bili: x / Urobili: x   Blood: x / Protein: x / Nitrite: x   Leuk Esterase: x / RBC: x / WBC x   Sq Epi: x / Non Sq Epi: x / Bacteria: x          IMAGING:  FINDINGS:  Acute transverse fracture through the anterior inferior corner of C5/osteophyte best seen image 32 of series 6. Moderate multilevel facet hypertrophy and uncovertebral hypertrophy. Moderate loss of disc space height at C5/C6. Mild anterolisthesis C2 over C3 and C3 over C4.. Craniocervical junction is normal. The cervicovertebral body heights and remaining intervertebral disc spaces are preserved. There is no prevertebral soft tissue abnormality. The odontoid process is intact.    1.9 cm posterior right thyroid lobe nodule on image 152 of series 3. Outpatient ultrasound and FNA are recommended as clinically warranted. The airway is patent. The upper lung apices are unremarkable. Mild bilateral carotid bulb calcifications.    Evaluation of the individual levels:  C2/C3 level: No spinal canal stenosis or foraminal narrowing.  C3/C4 level: No spinal canal stenosis or foraminal narrowing.  C4/C5 level: Broad-based ridging causing mild bilateral foraminal narrowing. No spinal canal stenosis  C5/C6 level: Broad-based ridging. No spinal canal stenosis or foraminal narrowing.  C6/C7 level: No spinal canal stenosis or foraminal narrowing.  C7/T1 level: No spinal canal stenosis or foraminal narrowing.    IMPRESSION:    CT head: Mild chronic microvascular changes without evidence of an acute transcortical infarction or hemorrhage. Large midline frontal scalp hematoma.    CT C-spine: Acute anterior inferior corner/osteophyte fracture at the C5 level. Findings discussed with Dr. Womack. Consider MRI as clinically warranted.

## 2023-08-07 NOTE — ED PROVIDER NOTE - NS ED ROS FT
Const: Denies fever, chills  HEENT: + Right orbital hematoma. Denies blurry vision, sore throat  Neck: Denies neck pain/stiffness  Resp: Denies coughing, SOB  Cardiovascular: Denies CP, palpitations, LE edema  GI: Denies nausea, vomiting, abdominal pain, diarrhea, constipation, blood in stool  : Denies urinary frequency/urgency/dysuria, hematuria  MSK: Denies back pain  Neuro: Denies HA, dizziness, numbness, weakness  Skin: Denies rashes.

## 2023-08-07 NOTE — ED ADULT NURSE NOTE - NS_SISCREENINGSR_GEN_ALL_ED
PT called stating since she had her pessary placed about 6 wks ago, she has a more yellow discharge, and possibly more of an odor. PT just wants to make sure this is normal because it has been going on since about 3 weeks after the pessary was placed (she noticed when removing it for PT).   PT available for remainder of the day.   Negative

## 2023-08-07 NOTE — ED CDU PROVIDER INITIAL DAY NOTE - PHYSICAL EXAMINATION
Gen: Well appearing in NAD  Head: +BILATERAL ECCHYMOSIS.   Neck: trachea midline +C-COLLAR  Resp:  No distress  Abd: soft, nd, nt  Ext: no deformities  Neuro:  A&O appears non focal  Skin:  Warm and dry as visualized  Psych:  Normal affect and mood

## 2023-08-07 NOTE — ED PROVIDER NOTE - CLINICAL SUMMARY MEDICAL DECISION MAKING FREE TEXT BOX
Patient brought in by EMS for head neck pain and forehead hematoma status post fall out of bed.  Patient denies LOC dizziness nausea vomiting weakness numbness chest pain short of breath abdominal pain arm pain leg pain back pain or any other complaints.  Patient ambulated after fall.  PMD forgot name    Plan CT head C-spine Tylenol for pain    Differential including but not limited to intracranial hemorrhage fracture Patient brought in by EMS for head neck pain and forehead hematoma status post fall out of bed.  Patient denies LOC dizziness nausea vomiting weakness numbness chest pain short of breath abdominal pain arm pain leg pain back pain or any other complaints.  Patient ambulated after fall.  Patient relates pain improved when placed in c collar by ems.  PMD forgot name    Plan CT head C-spine Tylenol for pain    Differential including but not limited to intracranial hemorrhage fracture

## 2023-08-07 NOTE — ED PROVIDER NOTE - NEUROLOGICAL, MLM
Alert and oriented, no focal deficits, no motor or sensory deficits. Alert and oriented, no focal deficits, no motor or sensory deficits. no foot drop

## 2023-08-07 NOTE — ED CDU PROVIDER INITIAL DAY NOTE - NS ED ROS FT
Review of Systems  SKIN: warm, dry w/ no rash or bleeding  RESPIRATORY: no cough or SOB  CARDIAC: no chest pain & no palpitations  GI: no abd pain, nausea, vomiting, diarrhea  MUSCULOSKELETAL:  no joint pain, swelling or redness +NECKPAIN  NEURO: Alert, oriented x3. No weakness, numbness. +HEADACHE

## 2023-08-07 NOTE — ED PROVIDER NOTE - OBJECTIVE STATEMENT
Patient brought in by EMS for head neck pain and forehead hematoma status post fall out of bed.  Patient denies LOC dizziness nausea vomiting weakness numbness chest pain short of breath abdominal pain arm pain leg pain back pain or any other complaints.  Patient ambulated after fall.  PMD forgot name Patient brought in by EMS for head neck pain and forehead hematoma status post fall out of bed.  Patient denies LOC dizziness nausea vomiting weakness numbness chest pain short of breath abdominal pain arm pain leg pain back pain or any other complaints.  Patient ambulated after fall.  Patient relates pain improved when placed in c collar by ems.  PMD forgot name

## 2023-08-07 NOTE — ED ADULT NURSE REASSESSMENT NOTE - NS ED NURSE REASSESS COMMENT FT1
Assumed care of patient at approx 19:30. Patient AxOx4. Patient denies pain/discomfort at this time. cervical collar remains in place. denies CP/SOB. Respirations equal/unlabored.  Patient has been updated on the plan of care. Patient offers no complaints at this time. No visible signs of acute distress noted, safety maintained.

## 2023-08-07 NOTE — ED PROVIDER NOTE - PROGRESS NOTE DETAILS
called [facility] d/w Michael ([facility] neurosurg) will accept xfer er->er. xfer center to notify trauma and er md per protocol

## 2023-08-07 NOTE — ED ADULT NURSE NOTE - OBJECTIVE STATEMENT
Pt is alert and oriented. Pt states that she fell out of her bed and hit her head on the floor. Pt has a hematoma to the forehead. Pt denies LOC, blurry vision and blood thinners. Pt denies sob, chest pain, nausea, vomiting, and dizziness. Pt resp are even and unlabored, skin color laurence for race. Pt updated on plan of care. Pt is alert and oriented. Pt states that she fell out of her bed and hit her head on the floor. Pt has a hematoma to the forehead. Pt denies LOC, blurry vision and blood thinners. C collar in place. Pt denies sob, chest pain, nausea, vomiting, and dizziness. Pt resp are even and unlabored, skin color laurence for race. Pt updated on plan of care.

## 2023-08-08 VITALS
HEART RATE: 86 BPM | DIASTOLIC BLOOD PRESSURE: 67 MMHG | SYSTOLIC BLOOD PRESSURE: 147 MMHG | RESPIRATION RATE: 18 BRPM | OXYGEN SATURATION: 98 % | TEMPERATURE: 98 F

## 2023-08-08 LAB
ANION GAP SERPL CALC-SCNC: 13 MMOL/L — SIGNIFICANT CHANGE UP (ref 5–17)
APPEARANCE UR: CLEAR — SIGNIFICANT CHANGE UP
BACTERIA # UR AUTO: ABNORMAL
BASOPHILS # BLD AUTO: 0.06 K/UL — SIGNIFICANT CHANGE UP (ref 0–0.2)
BASOPHILS NFR BLD AUTO: 0.5 % — SIGNIFICANT CHANGE UP (ref 0–2)
BILIRUB UR-MCNC: NEGATIVE — SIGNIFICANT CHANGE UP
BUN SERPL-MCNC: 18.9 MG/DL — SIGNIFICANT CHANGE UP (ref 8–20)
CALCIUM SERPL-MCNC: 9.3 MG/DL — SIGNIFICANT CHANGE UP (ref 8.4–10.5)
CHLORIDE SERPL-SCNC: 102 MMOL/L — SIGNIFICANT CHANGE UP (ref 96–108)
CO2 SERPL-SCNC: 23 MMOL/L — SIGNIFICANT CHANGE UP (ref 22–29)
COLOR SPEC: YELLOW — SIGNIFICANT CHANGE UP
CREAT SERPL-MCNC: 0.76 MG/DL — SIGNIFICANT CHANGE UP (ref 0.5–1.3)
DIFF PNL FLD: ABNORMAL
EGFR: 79 ML/MIN/1.73M2 — SIGNIFICANT CHANGE UP
EOSINOPHIL # BLD AUTO: 0.25 K/UL — SIGNIFICANT CHANGE UP (ref 0–0.5)
EOSINOPHIL NFR BLD AUTO: 2.2 % — SIGNIFICANT CHANGE UP (ref 0–6)
EPI CELLS # UR: SIGNIFICANT CHANGE UP
FERRITIN SERPL-MCNC: 10 NG/ML — LOW (ref 13–330)
GLUCOSE SERPL-MCNC: 113 MG/DL — HIGH (ref 70–99)
GLUCOSE UR QL: NEGATIVE MG/DL — SIGNIFICANT CHANGE UP
HCT VFR BLD CALC: 24.3 % — LOW (ref 34.5–45)
HGB BLD-MCNC: 7.3 G/DL — LOW (ref 11.5–15.5)
IMM GRANULOCYTES NFR BLD AUTO: 0.9 % — SIGNIFICANT CHANGE UP (ref 0–0.9)
IRON SATN MFR SERPL: 19 UG/DL — LOW (ref 37–145)
IRON SATN MFR SERPL: 4 % — LOW (ref 14–50)
KETONES UR-MCNC: NEGATIVE — SIGNIFICANT CHANGE UP
LEUKOCYTE ESTERASE UR-ACNC: NEGATIVE — SIGNIFICANT CHANGE UP
LYMPHOCYTES # BLD AUTO: 2.4 K/UL — SIGNIFICANT CHANGE UP (ref 1–3.3)
LYMPHOCYTES # BLD AUTO: 20.8 % — SIGNIFICANT CHANGE UP (ref 13–44)
MCHC RBC-ENTMCNC: 22.7 PG — LOW (ref 27–34)
MCHC RBC-ENTMCNC: 30 GM/DL — LOW (ref 32–36)
MCV RBC AUTO: 75.7 FL — LOW (ref 80–100)
MONOCYTES # BLD AUTO: 0.9 K/UL — SIGNIFICANT CHANGE UP (ref 0–0.9)
MONOCYTES NFR BLD AUTO: 7.8 % — SIGNIFICANT CHANGE UP (ref 2–14)
NEUTROPHILS # BLD AUTO: 7.83 K/UL — HIGH (ref 1.8–7.4)
NEUTROPHILS NFR BLD AUTO: 67.8 % — SIGNIFICANT CHANGE UP (ref 43–77)
NITRITE UR-MCNC: NEGATIVE — SIGNIFICANT CHANGE UP
OB PNL STL: NEGATIVE — SIGNIFICANT CHANGE UP
PH UR: 7 — SIGNIFICANT CHANGE UP (ref 5–8)
PLATELET # BLD AUTO: 370 K/UL — SIGNIFICANT CHANGE UP (ref 150–400)
POTASSIUM SERPL-MCNC: 4.5 MMOL/L — SIGNIFICANT CHANGE UP (ref 3.5–5.3)
POTASSIUM SERPL-SCNC: 4.5 MMOL/L — SIGNIFICANT CHANGE UP (ref 3.5–5.3)
PROT UR-MCNC: 30 MG/DL
RBC # BLD: 3.08 M/UL — LOW (ref 3.8–5.2)
RBC # BLD: 3.21 M/UL — LOW (ref 3.8–5.2)
RBC # FLD: 16.2 % — HIGH (ref 10.3–14.5)
RBC CASTS # UR COMP ASSIST: SIGNIFICANT CHANGE UP /HPF (ref 0–4)
RETICS #: 77.6 K/UL — SIGNIFICANT CHANGE UP (ref 25–125)
RETICS/RBC NFR: 2.5 % — SIGNIFICANT CHANGE UP (ref 0.5–2.5)
SODIUM SERPL-SCNC: 138 MMOL/L — SIGNIFICANT CHANGE UP (ref 135–145)
SP GR SPEC: 1.01 — SIGNIFICANT CHANGE UP (ref 1.01–1.02)
TIBC SERPL-MCNC: 515 UG/DL — HIGH (ref 220–430)
TRANSFERRIN SERPL-MCNC: 360 MG/DL — SIGNIFICANT CHANGE UP (ref 192–382)
UROBILINOGEN FLD QL: NEGATIVE MG/DL — SIGNIFICANT CHANGE UP
WBC # BLD: 11.54 K/UL — HIGH (ref 3.8–10.5)
WBC # FLD AUTO: 11.54 K/UL — HIGH (ref 3.8–10.5)
WBC UR QL: SIGNIFICANT CHANGE UP /HPF (ref 0–5)

## 2023-08-08 PROCEDURE — 84466 ASSAY OF TRANSFERRIN: CPT

## 2023-08-08 PROCEDURE — 87086 URINE CULTURE/COLONY COUNT: CPT

## 2023-08-08 PROCEDURE — 85045 AUTOMATED RETICULOCYTE COUNT: CPT

## 2023-08-08 PROCEDURE — 83550 IRON BINDING TEST: CPT

## 2023-08-08 PROCEDURE — 96374 THER/PROPH/DIAG INJ IV PUSH: CPT

## 2023-08-08 PROCEDURE — 72141 MRI NECK SPINE W/O DYE: CPT | Mod: 26,MA

## 2023-08-08 PROCEDURE — 85025 COMPLETE CBC W/AUTO DIFF WBC: CPT

## 2023-08-08 PROCEDURE — 82272 OCCULT BLD FECES 1-3 TESTS: CPT

## 2023-08-08 PROCEDURE — 71045 X-RAY EXAM CHEST 1 VIEW: CPT

## 2023-08-08 PROCEDURE — 83540 ASSAY OF IRON: CPT

## 2023-08-08 PROCEDURE — 87186 SC STD MICRODIL/AGAR DIL: CPT

## 2023-08-08 PROCEDURE — 82728 ASSAY OF FERRITIN: CPT

## 2023-08-08 PROCEDURE — 81001 URINALYSIS AUTO W/SCOPE: CPT

## 2023-08-08 PROCEDURE — 72141 MRI NECK SPINE W/O DYE: CPT | Mod: MA

## 2023-08-08 PROCEDURE — 99238 HOSP IP/OBS DSCHRG MGMT 30/<: CPT

## 2023-08-08 PROCEDURE — 72170 X-RAY EXAM OF PELVIS: CPT

## 2023-08-08 PROCEDURE — 99284 EMERGENCY DEPT VISIT MOD MDM: CPT | Mod: 25

## 2023-08-08 PROCEDURE — 72170 X-RAY EXAM OF PELVIS: CPT | Mod: 26

## 2023-08-08 PROCEDURE — 87077 CULTURE AEROBIC IDENTIFY: CPT

## 2023-08-08 PROCEDURE — 80048 BASIC METABOLIC PNL TOTAL CA: CPT

## 2023-08-08 PROCEDURE — 36415 COLL VENOUS BLD VENIPUNCTURE: CPT

## 2023-08-08 PROCEDURE — G0378: CPT

## 2023-08-08 PROCEDURE — 99233 SBSQ HOSP IP/OBS HIGH 50: CPT

## 2023-08-08 RX ORDER — FERROUS SULFATE 325(65) MG
1 TABLET ORAL
Qty: 30 | Refills: 0
Start: 2023-08-08 | End: 2023-09-06

## 2023-08-08 RX ORDER — FERROUS SULFATE 325(65) MG
325 TABLET ORAL ONCE
Refills: 0 | Status: DISCONTINUED | OUTPATIENT
Start: 2023-08-08 | End: 2023-08-14

## 2023-08-08 RX ORDER — ACETAMINOPHEN 500 MG
650 TABLET ORAL ONCE
Refills: 0 | Status: COMPLETED | OUTPATIENT
Start: 2023-08-08 | End: 2023-08-08

## 2023-08-08 RX ADMIN — Medication 75 MILLIGRAM(S): at 07:00

## 2023-08-08 RX ADMIN — AMLODIPINE BESYLATE 10 MILLIGRAM(S): 2.5 TABLET ORAL at 07:00

## 2023-08-08 RX ADMIN — LISINOPRIL 10 MILLIGRAM(S): 2.5 TABLET ORAL at 07:00

## 2023-08-08 NOTE — ED CDU PROVIDER SUBSEQUENT DAY NOTE - PROGRESS NOTE DETAILS
pt resting comfortably, wearing c-collar. Neuro intact. pending neurosurgery dispo/reccs. found to be anemic 7.7 initially, 7.3 on rpt AM labs. States she was not eating well because of multiple dental procedures, not sure if contributing to anemia. Initially told by PCP she was anemic in May however not sure what her hemoglobin was at that time. Guaiac negative. ordered iron supplement

## 2023-08-08 NOTE — PROGRESS NOTE ADULT - ASSESSMENT
84 yo F with a hx of HTN, HLD, depression who presents to the ED as a transfer from [place], s/p fall from bed. +HS, no LOC. Injury complex below. NVI    Plan:  -CXR negative, ? fracture indeterminate age posterior L 5-6 ribs  -pending MRI C-spine  -trauma to perform tertiary today  -f/u pelvic XR  -trauma to follow images
ASSESSMENT:   83yF PMHx of HTN and HLD presents as transfer from [place] after falling face first off her bed after sitting up quickly. CT C-spine showed C5 acute anterior inferior corner/osteophyte fx.     PLAN:    - Not a candidate for acute neurosurgical intervention at this time  - MRI C-spine w/w/o reviewed with Dr Rodriguez  - C-collar when OOB  - Pain control prn, avoid oversedation   -Trauma eval appreciated, discussed with ACS, they are signing off  - Patient to follow up with Dr Rodriguez as outpatient in 4 weeks, office to arrange C spine flexion/extension XR prior to appointment    - PT eval: home no needs  - Discussed case with Dr. Rodriguez

## 2023-08-08 NOTE — ED CDU PROVIDER SUBSEQUENT DAY NOTE - ATTENDING APP SHARED VISIT CONTRIBUTION OF CARE
pending MRI and final consultation with spine/trauma    I, Yan Manzano, participated in the care of this patient with the ACP. I discussed the history and physical exam findings as well as lab results and plan of care with the ACP. I agree with ACP's history, physical and assessment.

## 2023-08-08 NOTE — CHART NOTE - NSCHARTNOTEFT_GEN_A_CORE
15:50 SWNote: camir (Haley) informed worker pt d/c and in need of transportation to go home. Worker met with pt ,reportedly pt was sent via [facility] from [facility] . Pt lives in [place], states she does not have any family available and lives in a senior complex no one unable to drive. Pt concerned about transportation' cost , states she does not understand why she was transfer so far . Worker offered to call a taxi for pt ,pt in agreement . [employer] called , pt to go to [address] [place] [address] . Taxi $75 ,pt in agreement to pay it with credit card , pt will be taken in 10 min. Pt requested worker to stay with her to assist with taxi identification /done. No other SW needs reported at this moment.

## 2023-08-08 NOTE — ED CDU PROVIDER DISPOSITION NOTE - NSFOLLOWUPINSTRUCTIONS_ED_ALL_ED_FT
- Return to the ED for any new or worsening symptoms.   - Follow up with Dr Rodriguez in office as outpatient in 4 weeks, the office will arrange cervical spine flexion/extension XRAY prior to appointment    - Remain in Cervical collar whenever out of bed    - Follow-up with hematologist listed for further evaluation and management of anemia  - Take iron supplement as directed    Fracture    A fracture is a break in one of your bones. This can occur from a variety of injuries, especially traumatic ones. Symptoms include pain, bruising, or swelling. Do not use the injured limb. If a fracture is in one of the bones below your waist, do not put weight on that limb unless instructed to do so by your healthcare provider. Crutches or a cane may have been provided. A splint or cast may have been applied by your health care provider. Make sure to keep it dry and follow up with an orthopedist as instructed.    SEEK IMMEDIATE MEDICAL CARE IF YOU HAVE ANY OF THE FOLLOWING SYMPTOMS: numbness, tingling, increasing pain, or weakness in any part of the injured limb.     Anemia    Anemia is a condition in which the concentration of red blood cells or hemoglobin in the blood is below normal. Hemoglobin is a substance in red blood cells that carries oxygen to the tissues of the body. Anemia results in not enough oxygen reaching these tissues which can cause symptoms such as weakness, dizziness/lightheadedness, shortness of breath, chest pain, paleness, or nausea. The cause of your anemia may or may not be determined immediately. If your hemoglobin was dangerously low, you may have received a blood transfusion. Usually reactions to transfusions occur immediately but monitor yourself for any fevers, rash, or shortness of breath.    SEEK IMMEDIATE MEDICAL CARE IF YOU HAVE ANY OF THE FOLLOWING SYMPTOMS: extreme weakness/chest pain/shortness of breath, black or bloody stools, vomiting blood, fainting, fever, or any signs of dehydration.

## 2023-08-08 NOTE — ED CDU PROVIDER DISPOSITION NOTE - CLINICAL COURSE
82yo F presented as transfer from [place] for C5 fx s/p fall from edge of bed. Vitals stable. Neurologically intact. Pt evaluated by trauma and neurosurgery. MRI demonstrating flexion/extension injury mechanism with mild marrow edema within C5. Fitted for c-collar by orthotist. Evaluated by physical therapy, pt without skilled PT needs. Cleared for dc by trauma and neurosurgery, to f/u with Dr. Rodriguez in 4 weeks, flexion/extension xray to be arranged by office prior to 4 week f/u appt. Also anemic, guaiac negative. iron studies demonstrate iron deficiency anemia patter. Started on PO iron and provided heme f/u information. return precautions discussed.

## 2023-08-08 NOTE — ED CDU PROVIDER SUBSEQUENT DAY NOTE - CLINICAL SUMMARY MEDICAL DECISION MAKING FREE TEXT BOX
ASSESSMENT:   NEETU KENNEY is a 84yo F who presented as transfer s/p fall w/ C5 fracture. Vitals stable. Physical exam non contributory.     PLAN:   -C-collar at all times, MRI C-spine w/o, pain control. Trauma eval. Neurosurgery following

## 2023-08-08 NOTE — PROGRESS NOTE ADULT - SUBJECTIVE AND OBJECTIVE BOX
Zoey was awake and alert in bed as I fit her with an Aspen Multipost collar which she said felt much better than the previous one. The Velcro straps were marked to aide donning. Additional liners were provided for regular washing and changing. Previous brace may be used when bathing. Contact info was given to her. She was instructed on donning and adjustment of the brace. Brace should be wiped clean daily.    [facility]  
HPI: 83yF PMHx of HTN and HLD presents as transfer from [place] after falling face first off her bed after sitting up quickly. Denies any LOC. Obvious scalp hematoma and b/l periorbital edema. Denies any neck pain. Reports her only pain is in her forehead. Denies any extremity weakness, numbness/tingling. CT C-spine showed C5 acute anterior inferior corner/osteophyte fx. Transferred from [place] since unable to get MRI ther      INTERVAL HPI/OVERNIGHT EVENTS: Patient seen/examined by neurosurgery team. Patient is a very pleasant woman, awake and alert in the bed, denies pain.       Vital Signs Last 24 Hrs  T(C): 36.7 (08 Aug 2023 11:14), Max: 37 (07 Aug 2023 15:29)  T(F): 98.1 (08 Aug 2023 11:14), Max: 98.6 (07 Aug 2023 15:29)  HR: 75 (08 Aug 2023 11:14) (75 - 88)  BP: 140/66 (08 Aug 2023 11:14) (140/66 - 179/74)  BP(mean): --  RR: 18 (08 Aug 2023 11:14) (18 - 18)  SpO2: 96% (08 Aug 2023 11:14) (96% - 100%)    Parameters below as of 08 Aug 2023 11:14  Patient On (Oxygen Delivery Method): room air      PHYSICAL EXAM:    General: No Acute Distress     Neurological: Awake, C - collar in place, alert & oriented to person, place and time, following Commands, PERRL, EOMI, Visual fields intact, b/l periorbital ecchymosis, Face Symmetric, Speech Fluent, Moving all extremities     Muscle Strength: RUE: 5/5 LUE: 5/5 RLE: 5/5 LLE: 5/5  No Drift     Sensation Intact to Light Touch     Cerebellar: There is no dysmetria on finger to nose testing.    Gait : deferred    Pulmonary: non labored breathing, no use of accessory muscles    Cardiovascular:  Regular Rate and Rhythm     Gastrointestinal: Soft, Nontender, Nondistended           LABS:                        7.3    11.54 )-----------( 370      ( 08 Aug 2023 07:30 )             24.3     08-08    138  |  102  |  18.9  ----------------------------<  113<H>  4.5   |  23.0  |  0.76    Ca    9.3      08 Aug 2023 07:30    TPro  7.7  /  Alb  3.6  /  TBili  0.2  /  DBili  x   /  AST  21  /  ALT  25  /  AlkPhos  61  08-07    PT/INR - ( 07 Aug 2023 10:26 )   PT: 11.5 sec;   INR: 1.03 ratio         PTT - ( 07 Aug 2023 10:26 )  PTT:32.1 sec  Urinalysis Basic - ( 08 Aug 2023 08:35 )    Color: Yellow / Appearance: Clear / S.010 / pH: x  Gluc: x / Ketone: Negative  / Bili: Negative / Urobili: Negative mg/dL   Blood: x / Protein: 30 mg/dL / Nitrite: Negative   Leuk Esterase: Negative / RBC: 0-2 /HPF / WBC 0-2 /HPF   Sq Epi: x / Non Sq Epi: x / Bacteria: Occasional        RADIOLOGY & ADDITIONAL TESTS:      MR Cervical Spine No Cont (23 @ 04:26)      IMPRESSION:    Slightly increased STIR signal abnormality within the interspinous spaces   at C5-C6 (and to a lesser extent, at C6-C7), with additional marrow edema   along the inferior endplate of C5. Findings of mild soft tissue injury   along the interspinous and anterior longitudinal ligaments suggest   flexion/extension injury mechanism with mild marrow edema within C5.    Previously reported anteroinferior osteophyte fracture at C5 may be   present, although not well visualized in the current exam.    No cord signal abnormality.          
Patient seen and examined at bedside. No acute events overnight, describes minimal pain in face/neck    Vitals:  Vital Signs Last 24 Hrs  T(C): 36.9 (07 Aug 2023 23:29), Max: 37 (07 Aug 2023 15:29)  T(F): 98.4 (07 Aug 2023 23:29), Max: 98.6 (07 Aug 2023 15:29)  HR: 84 (07 Aug 2023 23:29) (77 - 108)  BP: 179/69 (07 Aug 2023 23:29) (153/71 - 179/74)  BP(mean): --  RR: 18 (07 Aug 2023 23:29) (16 - 18)  SpO2: 97% (07 Aug 2023 23:29) (97% - 100%)    Parameters below as of 07 Aug 2023 23:29  Patient On (Oxygen Delivery Method): room air        Labs:  08-07    137  |  100  |  19  ----------------------------<  137<H>  4.4   |  30  |  0.85    Ca    9.5      07 Aug 2023 10:26    TPro  7.7  /  Alb  3.6  /  TBili  0.2  /  DBili  x   /  AST  21  /  ALT  25  /  AlkPhos  61  08-07                            7.7    14.35 )-----------( 393      ( 07 Aug 2023 10:26 )             27.0       Exam:  Gen: pt lying in bed, alert, in NAD, facial swelling, R eye ecchymosis  Resp: unlabored  CVS: RRR  Abd: soft, NT, ND  Ext: moving all extremities spontaneously, sensation intact, pulses 2+

## 2023-08-08 NOTE — ED CDU PROVIDER DISPOSITION NOTE - PATIENT PORTAL LINK FT
You can access the FollowMyHealth Patient Portal offered by [facility] by registering at the following website: [email or web address]. By joining [employer]’s FollowMyHealth portal, you will also be able to view your health information using other applications (apps) compatible with our system.

## 2023-08-08 NOTE — ED ADULT NURSE REASSESSMENT NOTE - NS ED NURSE REASSESS COMMENT FT1
Received pt at this time, c-collar in place and periorbital ecchymosis noted to both eyes, pt is requesting to walk to the bathroom and has been educated on the dangers of walking but continually insists on ambulating

## 2023-08-08 NOTE — ED CDU PROVIDER DISPOSITION NOTE - CARE PROVIDER_API CALL
Nayeli Rodriguez  Neurosurgery  [address] [place] [address]  Phone: [phone]  Fax: [phone]  Follow Up Time:     Cade Edward  Hematology  [address] [place] [address]  Phone: [phone]  Fax: [phone]  Follow Up Time:

## 2023-08-08 NOTE — PROGRESS NOTE ADULT - ATTENDING COMMENTS
Patient seen on am rounds. No acute events overnight. no need for further trauma intervention. will do tertiary survey. please call with questions

## 2023-08-12 LAB
-  AMIKACIN: SIGNIFICANT CHANGE UP
-  AMOXICILLIN/CLAVULANIC ACID: SIGNIFICANT CHANGE UP
-  AMPICILLIN/SULBACTAM: SIGNIFICANT CHANGE UP
-  AMPICILLIN: SIGNIFICANT CHANGE UP
-  AZTREONAM: SIGNIFICANT CHANGE UP
-  CEFAZOLIN: SIGNIFICANT CHANGE UP
-  CEFEPIME: SIGNIFICANT CHANGE UP
-  CEFOXITIN: SIGNIFICANT CHANGE UP
-  CEFTRIAXONE: SIGNIFICANT CHANGE UP
-  CEFUROXIME: SIGNIFICANT CHANGE UP
-  CIPROFLOXACIN: SIGNIFICANT CHANGE UP
-  ERTAPENEM: SIGNIFICANT CHANGE UP
-  GENTAMICIN: SIGNIFICANT CHANGE UP
-  LEVOFLOXACIN: SIGNIFICANT CHANGE UP
-  MEROPENEM: SIGNIFICANT CHANGE UP
-  NITROFURANTOIN: SIGNIFICANT CHANGE UP
-  PIPERACILLIN/TAZOBACTAM: SIGNIFICANT CHANGE UP
-  TOBRAMYCIN: SIGNIFICANT CHANGE UP
-  TRIMETHOPRIM/SULFAMETHOXAZOLE: SIGNIFICANT CHANGE UP
CULTURE RESULTS: SIGNIFICANT CHANGE UP
METHOD TYPE: SIGNIFICANT CHANGE UP
ORGANISM # SPEC MICROSCOPIC CNT: SIGNIFICANT CHANGE UP
ORGANISM # SPEC MICROSCOPIC CNT: SIGNIFICANT CHANGE UP
SPECIMEN SOURCE: SIGNIFICANT CHANGE UP

## 2023-12-31 PROBLEM — Z23 NEED FOR VACCINATION WITH 13-POLYVALENT PNEUMOCOCCAL CONJUGATE VACCINE: Status: RESOLVED | Noted: 2019-11-12 | Resolved: 2023-12-31

## 2024-08-05 NOTE — ED PROVIDER NOTE - CHPI ED SYMPTOMS POS
Patient arrives to the ED with a cc of an allergic reaction. Patient had lobster at 1930 tonight. Patient is hyperventilating, flushed skin, and swelling of her lips and tongue.   BRUISING/PAIN

## 2024-12-12 ENCOUNTER — INPATIENT (INPATIENT)
Facility: HOSPITAL | Age: 84
LOS: 3 days | Discharge: HOME | DRG: 812 | End: 2024-12-16
Attending: GENERAL PRACTICE | Admitting: STUDENT IN AN ORGANIZED HEALTH CARE EDUCATION/TRAINING PROGRAM
Payer: MEDICARE

## 2024-12-12 VITALS
RESPIRATION RATE: 18 BRPM | SYSTOLIC BLOOD PRESSURE: 123 MMHG | WEIGHT: 147.05 LBS | HEART RATE: 83 BPM | HEIGHT: 63 IN | DIASTOLIC BLOOD PRESSURE: 50 MMHG | TEMPERATURE: 98 F | OXYGEN SATURATION: 99 %

## 2024-12-12 DIAGNOSIS — I10 ESSENTIAL (PRIMARY) HYPERTENSION: ICD-10-CM

## 2024-12-12 DIAGNOSIS — Z29.9 ENCOUNTER FOR PROPHYLACTIC MEASURES, UNSPECIFIED: ICD-10-CM

## 2024-12-12 DIAGNOSIS — D64.9 ANEMIA, UNSPECIFIED: ICD-10-CM

## 2024-12-12 DIAGNOSIS — E78.5 HYPERLIPIDEMIA, UNSPECIFIED: ICD-10-CM

## 2024-12-12 DIAGNOSIS — Z86.59 PERSONAL HISTORY OF OTHER MENTAL AND BEHAVIORAL DISORDERS: ICD-10-CM

## 2024-12-12 LAB
ABO RH CONFIRMATION: SIGNIFICANT CHANGE UP
ALBUMIN SERPL ELPH-MCNC: 3.6 G/DL — SIGNIFICANT CHANGE UP (ref 3.3–5)
ALP SERPL-CCNC: 63 U/L — SIGNIFICANT CHANGE UP (ref 40–120)
ALT FLD-CCNC: 16 U/L — SIGNIFICANT CHANGE UP (ref 12–78)
ANION GAP SERPL CALC-SCNC: 4 MMOL/L — LOW (ref 5–17)
APTT BLD: 29.9 SEC — SIGNIFICANT CHANGE UP (ref 24.5–35.6)
AST SERPL-CCNC: 11 U/L — LOW (ref 15–37)
BASOPHILS # BLD AUTO: 0.04 K/UL — SIGNIFICANT CHANGE UP (ref 0–0.2)
BASOPHILS NFR BLD AUTO: 0.4 % — SIGNIFICANT CHANGE UP (ref 0–2)
BILIRUB SERPL-MCNC: 0.2 MG/DL — SIGNIFICANT CHANGE UP (ref 0.2–1.2)
BUN SERPL-MCNC: 19 MG/DL — SIGNIFICANT CHANGE UP (ref 7–23)
CALCIUM SERPL-MCNC: 9.6 MG/DL — SIGNIFICANT CHANGE UP (ref 8.5–10.1)
CHLORIDE SERPL-SCNC: 108 MMOL/L — SIGNIFICANT CHANGE UP (ref 96–108)
CO2 SERPL-SCNC: 27 MMOL/L — SIGNIFICANT CHANGE UP (ref 22–31)
CREAT SERPL-MCNC: 0.92 MG/DL — SIGNIFICANT CHANGE UP (ref 0.5–1.3)
EGFR: 62 ML/MIN/1.73M2 — SIGNIFICANT CHANGE UP
EOSINOPHIL # BLD AUTO: 0.12 K/UL — SIGNIFICANT CHANGE UP (ref 0–0.5)
EOSINOPHIL NFR BLD AUTO: 1.1 % — SIGNIFICANT CHANGE UP (ref 0–6)
GLUCOSE SERPL-MCNC: 107 MG/DL — HIGH (ref 70–99)
HCT VFR BLD CALC: 18.3 % — CRITICAL LOW (ref 34.5–45)
HGB BLD-MCNC: 5.3 G/DL — CRITICAL LOW (ref 11.5–15.5)
IMM GRANULOCYTES NFR BLD AUTO: 0.9 % — SIGNIFICANT CHANGE UP (ref 0–0.9)
INR BLD: 1.15 RATIO — SIGNIFICANT CHANGE UP (ref 0.85–1.16)
LYMPHOCYTES # BLD AUTO: 1.21 K/UL — SIGNIFICANT CHANGE UP (ref 1–3.3)
LYMPHOCYTES # BLD AUTO: 11.6 % — LOW (ref 13–44)
MAGNESIUM SERPL-MCNC: 2.1 MG/DL — SIGNIFICANT CHANGE UP (ref 1.6–2.6)
MCHC RBC-ENTMCNC: 22.8 PG — LOW (ref 27–34)
MCHC RBC-ENTMCNC: 29 G/DL — LOW (ref 32–36)
MCV RBC AUTO: 78.9 FL — LOW (ref 80–100)
MONOCYTES # BLD AUTO: 0.7 K/UL — SIGNIFICANT CHANGE UP (ref 0–0.9)
MONOCYTES NFR BLD AUTO: 6.7 % — SIGNIFICANT CHANGE UP (ref 2–14)
NEUTROPHILS # BLD AUTO: 8.28 K/UL — HIGH (ref 1.8–7.4)
NEUTROPHILS NFR BLD AUTO: 79.3 % — HIGH (ref 43–77)
NRBC # BLD: 0 /100 WBCS — SIGNIFICANT CHANGE UP (ref 0–0)
OB PNL STL: POSITIVE
PLATELET # BLD AUTO: 397 K/UL — SIGNIFICANT CHANGE UP (ref 150–400)
POTASSIUM SERPL-MCNC: 4.1 MMOL/L — SIGNIFICANT CHANGE UP (ref 3.5–5.3)
POTASSIUM SERPL-SCNC: 4.1 MMOL/L — SIGNIFICANT CHANGE UP (ref 3.5–5.3)
PROT SERPL-MCNC: 7.3 G/DL — SIGNIFICANT CHANGE UP (ref 6–8.3)
PROTHROM AB SERPL-ACNC: 13.6 SEC — HIGH (ref 9.9–13.4)
RBC # BLD: 2.32 M/UL — LOW (ref 3.8–5.2)
RBC # FLD: 18.1 % — HIGH (ref 10.3–14.5)
SODIUM SERPL-SCNC: 139 MMOL/L — SIGNIFICANT CHANGE UP (ref 135–145)
WBC # BLD: 10.44 K/UL — SIGNIFICANT CHANGE UP (ref 3.8–10.5)
WBC # FLD AUTO: 10.44 K/UL — SIGNIFICANT CHANGE UP (ref 3.8–10.5)

## 2024-12-12 PROCEDURE — 71045 X-RAY EXAM CHEST 1 VIEW: CPT | Mod: 26

## 2024-12-12 PROCEDURE — 99222 1ST HOSP IP/OBS MODERATE 55: CPT | Mod: GC

## 2024-12-12 PROCEDURE — 93010 ELECTROCARDIOGRAM REPORT: CPT

## 2024-12-12 PROCEDURE — 99285 EMERGENCY DEPT VISIT HI MDM: CPT

## 2024-12-12 RX ORDER — VENLAFAXINE HYDROCHLORIDE 75 MG/1
150 CAPSULE, EXTENDED RELEASE ORAL DAILY
Refills: 0 | Status: DISCONTINUED | OUTPATIENT
Start: 2024-12-13 | End: 2024-12-16

## 2024-12-12 RX ORDER — AMLODIPINE BESYLATE 10 MG/1
10 TABLET ORAL DAILY
Refills: 0 | Status: DISCONTINUED | OUTPATIENT
Start: 2024-12-13 | End: 2024-12-13

## 2024-12-12 RX ORDER — ONDANSETRON HYDROCHLORIDE 4 MG/1
4 TABLET, FILM COATED ORAL EVERY 8 HOURS
Refills: 0 | Status: DISCONTINUED | OUTPATIENT
Start: 2024-12-12 | End: 2024-12-16

## 2024-12-12 RX ORDER — PANTOPRAZOLE SODIUM 40 MG/1
40 TABLET, DELAYED RELEASE ORAL
Refills: 0 | Status: DISCONTINUED | OUTPATIENT
Start: 2024-12-13 | End: 2024-12-16

## 2024-12-12 RX ORDER — VENLAFAXINE HYDROCHLORIDE 75 MG/1
1 CAPSULE, EXTENDED RELEASE ORAL
Refills: 0 | DISCHARGE

## 2024-12-12 RX ORDER — AMLODIPINE BESYLATE 10 MG/1
1 TABLET ORAL
Refills: 0 | DISCHARGE

## 2024-12-12 RX ORDER — ROSUVASTATIN CALCIUM 5 MG/1
1 TABLET, FILM COATED ORAL
Refills: 0 | DISCHARGE

## 2024-12-12 RX ORDER — ACETAMINOPHEN, DIPHENHYDRAMINE HCL, PHENYLEPHRINE HCL 325; 25; 5 MG/1; MG/1; MG/1
3 TABLET ORAL AT BEDTIME
Refills: 0 | Status: DISCONTINUED | OUTPATIENT
Start: 2024-12-12 | End: 2024-12-16

## 2024-12-12 RX ORDER — LISINOPRIL 20 MG/1
1 TABLET ORAL
Refills: 0 | DISCHARGE

## 2024-12-12 RX ORDER — PANTOPRAZOLE SODIUM 40 MG/1
40 TABLET, DELAYED RELEASE ORAL ONCE
Refills: 0 | Status: COMPLETED | OUTPATIENT
Start: 2024-12-12 | End: 2024-12-12

## 2024-12-12 RX ORDER — LISINOPRIL 20 MG/1
20 TABLET ORAL DAILY
Refills: 0 | Status: DISCONTINUED | OUTPATIENT
Start: 2024-12-13 | End: 2024-12-13

## 2024-12-12 RX ORDER — MAGNESIUM, ALUMINUM HYDROXIDE 200-225/5
30 SUSPENSION, ORAL (FINAL DOSE FORM) ORAL EVERY 4 HOURS
Refills: 0 | Status: DISCONTINUED | OUTPATIENT
Start: 2024-12-12 | End: 2024-12-16

## 2024-12-12 RX ORDER — ROSUVASTATIN CALCIUM 5 MG/1
20 TABLET, FILM COATED ORAL AT BEDTIME
Refills: 0 | Status: DISCONTINUED | OUTPATIENT
Start: 2024-12-12 | End: 2024-12-16

## 2024-12-12 RX ORDER — ACETAMINOPHEN 500MG 500 MG/1
650 TABLET, COATED ORAL EVERY 6 HOURS
Refills: 0 | Status: DISCONTINUED | OUTPATIENT
Start: 2024-12-12 | End: 2024-12-16

## 2024-12-12 RX ORDER — INFLUENZA VIRUS VACCINE 15; 15; 15; 15 UG/.5ML; UG/.5ML; UG/.5ML; UG/.5ML
0.5 SUSPENSION INTRAMUSCULAR ONCE
Refills: 0 | Status: DISCONTINUED | OUTPATIENT
Start: 2024-12-12 | End: 2024-12-16

## 2024-12-12 RX ADMIN — ROSUVASTATIN CALCIUM 20 MILLIGRAM(S): 5 TABLET, FILM COATED ORAL at 22:58

## 2024-12-12 RX ADMIN — PANTOPRAZOLE SODIUM 40 MILLIGRAM(S): 40 TABLET, DELAYED RELEASE ORAL at 16:56

## 2024-12-12 NOTE — H&P ADULT - ATTENDING COMMENTS
pt ws seen and examined at bedside.   This is a 84 year old woman with PMH of iron deficiency, HTN, HLD, and depression, who presents to the ED for a low hemoglobin, admitted for symptomatic anemia.     PE  GENERAL: patient appears well, no acute distress, appropriately interactive  LUNGS: good air entry bilaterally, clear to auscultation, symmetric breath sounds, no wheezing or rhonchi appreciated  HEART: soft S1/S2, regular rate and rhythm, +systolic murmur, no lower extremity edema  GASTROINTESTINAL: abdomen is soft, nontender, nondistended, normoactive bowel sounds  Ext: no swelling or erythema    A/P  1) Symptomatic Anemia  pt w/ SHEEHAN and fatigue, hx of iron deficiency on iron infusions   - Hgb 5.3 on admission; s/p protonix 40 IV x 1 and 2U pRBCs in ED  - AM iron panel ordered  - monitor daily CBC  - maintain active type+screen   - c/w Protonix 40 mg IV bid  - clear liquid diet  - GI consulted, Dr. Lawson, f/u recs  - Heme consulted, Dr. Parikh, f/u recs  2) Other chronic conditons  as above   Case discussed with resident    Agree with above

## 2024-12-12 NOTE — ED ADULT NURSE NOTE - OBJECTIVE STATEMENT
Received pt in room 5B, 84 yr/o female A+OX4, ambulatory at baseline. hx of iron deficiency, and HTN. pt was sent to ED by PCP for C/O low hgb of 5.2. pt endorses increased fatigue and dyspnia on exertion. pt denies abdomen is flat, soft, nontender; denies nausea, vomiting, diarrhea, and constipation. pt denies blood in stool. PERRLA and facial symmetry noted. pt denies numbness, tingling, and weakness in peripheral extremities. VSS, denies chest pain and SOB, RR even and unlabored. abdomen is flat, soft, nontender; denies nausea, vomiting, diarrhea, and constipation. pt has active and passive ROM of all extremities, no obvious joint deformities noted. skin is clean dry and intact.

## 2024-12-12 NOTE — H&P ADULT - NSHPSOCIALHISTORY_GEN_ALL_CORE
Lives alone in a senior development   ADLs independent  ambulation independent  alcohol rarely  rec drug denies  tobacco denies

## 2024-12-12 NOTE — PATIENT PROFILE ADULT - FALL HARM RISK - UNIVERSAL INTERVENTIONS
Bed in lowest position, wheels locked, appropriate side rails in place/Call bell, personal items and telephone in reach/Instruct patient to call for assistance before getting out of bed or chair/Non-slip footwear when patient is out of bed/[place] to call system/Physically safe environment - no spills, clutter or unnecessary equipment/Purposeful Proactive Rounding/Room/bathroom lighting operational, light cord in reach

## 2024-12-12 NOTE — ED ADULT TRIAGE NOTE - CHIEF COMPLAINT QUOTE
Fatigue/weakness, insomnia, no appetite x 1 week, 5 lbs weight loss and muscle aches and pain and SOB. Anemia HGB 5.2

## 2024-12-12 NOTE — H&P ADULT - HISTORY OF PRESENT ILLNESS
84 year old woman with PMH of iron deficiency, HTN, HLD, and depression, presents with symptomatic anemia. Patient had outpatient labs done and was found to have a hemoglobin of 5.2. Was sent to the ED her hematologist Dr Parikh. She reports fatigue, SHEEHAN, and recently had some dark colored stools as well. Denies A/C use. Has never had a blood transfusion in the past. Her last colonscopy was 9 years ago. Denies chest pain or abdominal pain.    Denies fever, chills, chest pain, palpitations, SOB, cough, abdominal pain, nausea, vomiting, diarrhea, constipation, urinary frequency, urgency, or dysuria, headaches, changes in vision, dizziness, numbness, tingling.  Denies recent travel, recent antibiotic use, or sick contacts.    ED Course:   Vitals: BP: 123/50, HR: 83, Temp: 98.1, RR: 18, SpO2: 99% on RA   Labs:   Hgb 5.3 Hct 18.3  FOBT positive  CXR: 1. Minimal linear atelectasis on the left with no infiltrates, pleural effusions or CHF. 2. Atherosclerosis as noted. 3. Degenerative changes of the thoracic spine.   EKG: NSR @ 83 bpm, marked ST abnormality (possible inferior subendocardial injury), Qtc 439  Received in the ED: Protonix 40 mg IV x 1, 2U pRBCs   84 year old woman with PMH of iron deficiency, HTN, HLD, and depression, presents with symptomatic anemia. Patient had outpatient labs done and was found to have a hemoglobin of 5.2. Was sent to the ED her hematologist Dr Parikh. She reports fatigue, SHEEHAN, and recently had some dark colored stools as well. Denies A/C use. Has never had a blood transfusion in the past. Her last colonoscopy was 9 years ago. Denies chest pain or abdominal pain.    Previously in June had a black bowel movement, with bright red blood in the stool as well. Was found to have low iron and was given 2 doses of  Feraheme. SAw hematologist and was stared on  oral iron     Denies fever, chills, chest pain, palpitations, SOB, cough, abdominal pain, nausea, vomiting, diarrhea, constipation, urinary frequency, urgency, or dysuria, headaches, changes in vision, dizziness, numbness, tingling.  Denies recent travel, recent antibiotic use, or sick contacts.    ED Course:   Vitals: BP: 123/50, HR: 83, Temp: 98.1, RR: 18, SpO2: 99% on RA   Labs:   Hgb 5.3 Hct 18.3  FOBT positive  CXR: 1. Minimal linear atelectasis on the left with no infiltrates, pleural effusions or CHF. 2. Atherosclerosis as noted. 3. Degenerative changes of the thoracic spine.   EKG: NSR @ 83 bpm, marked ST abnormality (possible inferior subendocardial injury), Qtc 439  Received in the ED: Protonix 40 mg IV x 1, 2U pRBCs   84 year old woman with PMH of iron deficiency, HTN, HLD, and depression, who presents to the ED for a low hemoglobin. Patient has been feeling fatigued and SOB with exertion for the past 3 weeks. Normally able to ambulate with any issue/SOB, but recently has been SOB after walking 50 feet.     Patient had outpatient labs done and was found to have a hemoglobin of 5.2. Was sent to the ED her hematologist Dr Parikh. She reports fatigue, SHEEHAN, and recently had some dark colored stools as well. Denies A/C use. Has never had a blood transfusion in the past.     Previously in June pt had a black bowel movement, with bright red blood in the stool. Pt attributed this to drinking a large amount of tomato soup. Pt went to see her PCP and was found to have low iron and was given 2 doses of  Feraheme. Saw hematologist and was stared iron infusions every 6 months, which was then increased to every 3 months, her last iron infusion was about 3 months ago. Her last colonoscopy was 9 years ago, which she reports was normal.     Admits to fatigue, SHEEHAN, palpitations (w/ exertion), and numbness/tingling in b/l feet (chronic, hx of neuropathy)  Denies fever, chills, chest pain, cough, abdominal pain, nausea, vomiting, diarrhea, constipation, dysuria, headaches,  dizziness, hematuria, hematochezia, hematemesis.   Denies recent travel or recent antibiotic use.    ED Course:   Vitals: BP: 123/50, HR: 83, Temp: 98.1, RR: 18, SpO2: 99% on RA   Labs:   Hgb 5.3 Hct 18.3  FOBT positive  CXR: 1. Minimal linear atelectasis on the left with no infiltrates, pleural effusions or CHF. 2. Atherosclerosis as noted. 3. Degenerative changes of the thoracic spine.   EKG: NSR @ 83 bpm, marked ST abnormality (possible inferior subendocardial injury), Qtc 439  Received in the ED: Protonix 40 mg IV x 1, 2U pRBCs   84 year old woman with PMH of iron deficiency, HTN, HLD, and depression, who presents to the ED for a low hemoglobin. Patient has been feeling fatigued and SOB with exertion for the past 3 weeks. Normally able to ambulate with any issue/SOB, but recently has been SOB after walking 50 feet.  Reports she has episodes of black stool without any bright red blood; last dark BM was 3-4 days ago. States ~30% of her BMs are black every week. pt attributes this to her diet, which is high in tomatoes, spinach, and salads. Patient had a routine appt with her hematologist today. Outpatient labs were done and he was found to have a hemoglobin of 5.2. Was sent to the ED her hematologist Dr Parikh. She reports fatigue, SHEEHAN, and recently had some dark colored stools as well. Denies A/C use. Has never had a blood transfusion in the past.     Previously in June pt had a black bowel movement, with bright red blood in the stool. Pt attributed this to drinking a large amount of tomato soup. Pt went to see her PCP and was found to have low iron and was given 2 doses of  Feraheme. Saw hematologist and was stared iron infusions every 6 months, which was then increased to every 3 months, her last iron infusion was about 3 months ago. Her last colonoscopy was 9 years ago, which she reports was normal.     Pt reports feelings of depression in her life. Reports her daughter passed away in 2019 and she has no family. Speaks with a therapist weekly.     Admits to fatigue, SHEEHAN, palpitations (w/ exertion), and numbness/tingling in b/l feet (chronic, hx of neuropathy)  Denies fever, chills, chest pain, cough, abdominal pain, nausea, vomiting, diarrhea, constipation, dysuria, headaches,  dizziness, hematuria, hematochezia, hematemesis.   Denies recent travel or recent antibiotic use.    ED Course:   Vitals: BP: 123/50, HR: 83, Temp: 98.1, RR: 18, SpO2: 99% on RA   Labs:   Hgb 5.3 Hct 18.3  FOBT positive  CXR: 1. Minimal linear atelectasis on the left with no infiltrates, pleural effusions or CHF. 2. Atherosclerosis as noted. 3. Degenerative changes of the thoracic spine.   EKG: NSR @ 83 bpm, marked ST abnormality (possible inferior subendocardial injury), Qtc 439  Received in the ED: Protonix 40 mg IV x 1, 2U pRBCs   84 year old woman with PMH of iron deficiency, HTN, HLD, and depression, who presents to the ED for a low hemoglobin. Patient has been feeling fatigued and SOB with exertion for the past 3 weeks. Normally able to ambulate with any issue/SOB, but recently has been SOB after walking 50 feet.  Reports she has episodes of black stool without any bright red blood; last dark BM was 3-4 days ago. States ~30% of her BMs are black every week. pt attributes this to her diet, which is high in tomatoes, spinach, and salads. Patient had a routine appt with her hematologist today. Outpatient labs were done and he was found to have a hemoglobin of 5.2. Was sent to the ED her hematologist Dr Parikh. She reports fatigue, SHEEHAN, and recently had some dark colored stools as well. Denies A/C use. Has never had a blood transfusion in the past. Previously in June pt had a black bowel movement, with bright red blood in the stool. Pt attributed this to drinking a large amount of tomato soup. Pt went to see her PCP and was found to have low iron and was given 2 doses of  Feraheme. Saw hematologist and was started on PO iron and then switched to iron infusions every 6 months, which was then increased to every 3 months, her last iron infusion was about 3 months ago. She was advised to see GI, but pt never did. Her last colonoscopy was 9 years ago, which she reports was normal.     Pt reports feelings of depression in her life. Reports her daughter passed away in 2019 and she has no family. Speaks with a therapist weekly.     Admits to fatigue, SHEEHAN, palpitations (w/ exertion), and numbness/tingling in b/l feet (chronic, hx of neuropathy)  Denies fever, chills, chest pain, cough, abdominal pain, nausea, vomiting, diarrhea, constipation, dysuria, headaches,  dizziness, hematuria, hematochezia, hematemesis.   Denies recent travel or recent antibiotic use.    ED Course:   Vitals: BP: 123/50, HR: 83, Temp: 98.1, RR: 18, SpO2: 99% on RA   Labs:   Hgb 5.3 Hct 18.3  FOBT positive  CXR: 1. Minimal linear atelectasis on the left with no infiltrates, pleural effusions or CHF. 2. Atherosclerosis as noted. 3. Degenerative changes of the thoracic spine.   EKG: NSR @ 83 bpm, marked ST abnormality (possible inferior subendocardial injury), Qtc 439  Received in the ED: Protonix 40 mg IV x 1, 2U pRBCs   84 year old woman with PMH of iron deficiency, HTN, HLD, and depression, who presents to the ED for a low hemoglobin. Patient has been feeling fatigued and SOB with exertion for the past 3 weeks. Normally able to ambulate without any issue/SOB, but recently has been SOB after walking 50 feet.  Reports she has episodes of black stool without any bright red blood; last dark BM was 3-4 days ago. States ~30% of her BMs are black every week. pt attributes this to her diet, which is high in tomatoes, spinach, and salads. Patient had a routine appt with her hematologist today. Outpatient labs were done and he was found to have a hemoglobin of 5.2. Was sent to the ED her hematologist Dr Parikh. She reports fatigue, SHEEHAN, and recently had some dark colored stools as well. Denies A/C use. Has never had a blood transfusion in the past. Previously in June pt had a black bowel movement, with bright red blood in the stool. Pt attributed this to drinking a large amount of tomato soup. Pt went to see her PCP and was found to have low iron and was given 2 doses of  Feraheme. Saw hematologist and was started on PO iron and then switched to iron infusions every 6 months, which was then increased to every 3 months, her last iron infusion was about 3 months ago. She was advised to see GI, but pt never did. Her last colonoscopy was 9 years ago, which she reports was normal.     Pt reports feelings of depression in her life. Reports her daughter passed away in 2019 and she has no family. Speaks with a therapist weekly.     Admits to fatigue, SHEEHAN, palpitations (w/ exertion), and numbness/tingling in b/l feet (chronic, hx of neuropathy)  Denies fever, chills, chest pain, cough, abdominal pain, nausea, vomiting, diarrhea, constipation, dysuria, headaches,  dizziness, hematuria, hematochezia, hematemesis.   Denies recent travel or recent antibiotic use.    ED Course:   Vitals: BP: 123/50, HR: 83, Temp: 98.1, RR: 18, SpO2: 99% on RA   Labs:   Hgb 5.3 Hct 18.3  FOBT positive  CXR: 1. Minimal linear atelectasis on the left with no infiltrates, pleural effusions or CHF. 2. Atherosclerosis as noted. 3. Degenerative changes of the thoracic spine.   EKG: NSR @ 83 bpm, marked ST abnormality (possible inferior subendocardial injury), Qtc 439  Received in the ED: Protonix 40 mg IV x 1, 2U pRBCs   84 year old woman with PMH of iron deficiency, HTN, HLD, and depression, who presents to the ED for a low hemoglobin. Patient had a routine appt with her hematologist today. Outpatient labs were done and he was found to have a hemoglobin of 5.2. Was sent to the ED by her hematologist Dr Parikh. She reports fatigue, SHEEHAN, and recently had some dark colored stools as well.  Patient has been feeling fatigued and SOB with exertion for the past 3 weeks. Normally able to ambulate without any issue/SOB, but recently has been SOB after walking 50 feet.  Reports she has episodes of black stool without any bright red blood; last dark BM was 3-4 days ago. States ~30% of her BMs are black every week. pt attributes this to her diet, which is high in tomatoes, spinach, and salads. Denies A/C use. Has never had a blood transfusion in the past. Previously in June pt had a black bowel movement, with bright red blood in the stool. Pt attributed this to drinking a large amount of tomato soup. Pt went to see her PCP and was found to have low iron and was given 2 doses of  Feraheme. Saw hematologist and was started on PO iron and then switched to iron infusions every 6 months, which was then increased to every 3 months, her last iron infusion was about 3 months ago. She was advised to see GI, but pt never did. Her last colonoscopy was 9 years ago, which she reports was normal.     Pt reports feelings of depression in her life. Reports her daughter passed away in 2019 and she has no family. Speaks with a therapist weekly.     Admits to fatigue, SHEEHAN, palpitations (w/ exertion), and numbness/tingling in b/l feet (chronic, hx of neuropathy)  Denies fever, chills, chest pain, cough, abdominal pain, nausea, vomiting, diarrhea, constipation, dysuria, headaches,  dizziness, hematuria, hematochezia, hematemesis.   Denies recent travel or recent antibiotic use.    ED Course:   Vitals: BP: 123/50, HR: 83, Temp: 98.1, RR: 18, SpO2: 99% on RA   Labs:   Hgb 5.3 Hct 18.3  FOBT positive  CXR: 1. Minimal linear atelectasis on the left with no infiltrates, pleural effusions or CHF. 2. Atherosclerosis as noted. 3. Degenerative changes of the thoracic spine.   EKG: NSR @ 83 bpm, marked ST abnormality (possible inferior subendocardial injury), Qtc 439  Received in the ED: Protonix 40 mg IV x 1, 2U pRBCs   84 year old woman with PMH of iron deficiency, HTN, HLD, and depression, who presents to the ED for a low hemoglobin. Patient had a routine appt with her hematologist today. Outpatient labs were done and he was found to have a hemoglobin of 5.2. Was sent to the ED by her hematologist Dr Parikh. She reports fatigue, SHEEHAN, and recently had some dark colored stools as well.  Patient has been feeling fatigued and SOB with exertion for the past 3 weeks. Normally able to ambulate without any issue/SOB, but recently has been SOB after walking 50 feet.  Reports she has episodes of black stool without any bright red blood; last dark BM was 3-4 days ago. States ~30% of her BMs are black every week. Had a BM today which she reports was normal in color. Pt attributes the dark BMs to her diet, which is high in tomatoes, spinach, and salads. Denies A/C use. Has never had a blood transfusion in the past. Previously in June pt had a black bowel movement, with bright red blood in the stool. Pt attributed this to drinking a large amount of tomato soup. Pt went to see her PCP and was found to have low iron and was given 2 doses of  Feraheme. Saw hematologist and was started on PO iron and then switched to iron infusions every 6 months, which was then increased to every 3 months, her last iron infusion was about 3 months ago. She was advised to see GI, but pt never did. Her last colonoscopy was 9 years ago, which she reports was normal.     Pt reports feelings of depression in her life. Reports her daughter passed away in 2019 and she has no family. Speaks with a therapist weekly.     Admits to fatigue, SHEEHAN, palpitations (w/ exertion), and numbness/tingling in b/l feet (chronic, hx of neuropathy)  Denies fever, chills, chest pain, cough, abdominal pain, nausea, vomiting, diarrhea, constipation, dysuria, headaches,  dizziness, hematuria, hematochezia, hematemesis.   Denies recent travel or recent antibiotic use.    ED Course:   Vitals: BP: 123/50, HR: 83, Temp: 98.1, RR: 18, SpO2: 99% on RA   Labs:   Hgb 5.3 Hct 18.3  FOBT positive  CXR: 1. Minimal linear atelectasis on the left with no infiltrates, pleural effusions or CHF. 2. Atherosclerosis as noted. 3. Degenerative changes of the thoracic spine.   EKG: NSR @ 83 bpm, marked ST abnormality (possible inferior subendocardial injury), Qtc 439  Received in the ED: Protonix 40 mg IV x 1, 2U pRBCs

## 2024-12-12 NOTE — H&P ADULT - PROBLEM SELECTOR PLAN 2
DVT ppx: SCDs; hold a/c in setting of suspected bleed chronic  c/w home  monitor routine hemodynamics chronic  - c/w home amlodipine 10 mg qd  - c/w home lisinopril 20 mg qd  - monitor routine hemodynamics chronic  - c/w home amlodipine 10 mg qd w/ hold parameters  - c/w home lisinopril 20 mg qd  w/ hold parameters  - monitor routine hemodynamics

## 2024-12-12 NOTE — H&P ADULT - PROBLEM SELECTOR PLAN 1
Hgb 5.3 on admission  s/p protonix 40 IV x 1 and 2U pRBCs  - monitor daily CBC  - maintain active type+screen   - c/w Protonix 40 mg IV bid  - GI consulted, Dr. Lawson, f/u results Hgb 5.3 on admission; s/p protonix 40 IV x 1 and 2U pRBCs in ED  - monitor daily CBC  - maintain active type+screen   - c/w Protonix 40 mg IV bid  - clear liquid diet  - GI consulted, Dr. Lawson, f/u results pt w/ SHEEHAN and fatigue, hx of iron deficiency on iron infusions   - Hgb 5.3 on admission; s/p protonix 40 IV x 1 and 2U pRBCs in ED  - AM iron panel ordered  - monitor daily CBC  - maintain active type+screen   - c/w Protonix 40 mg IV bid  - clear liquid diet  - GI consulted, Dr. Lawson, f/u recs  - Heme consulted, Dr. Parikh, f/u recs pt w/ SHEEHAN and fatigue, hx of iron deficiency on iron infusions   - Hgb 5.3 FOBT + on admission; s/p protonix 40 IV x 1 and 2U pRBCs in ED  - AM iron panel ordered  - monitor daily CBC  - maintain active type+screen   - c/w Protonix 40 mg IV bid  - clear liquid diet  - GI consulted, Dr. Lawson, f/u recs  - Heme consulted, Dr. Parikh, f/u recs

## 2024-12-12 NOTE — ED PROVIDER NOTE - PROGRESS NOTE DETAILS
Patient was consented for blood transfusions, risks and benefits explained. she will get 2 units of blood for her hemoglobin of 5.3. patient's FOBT is also positive, given PPI. will require admission. patient has abnormal ECG but no active chest pain and no old ECG to compare to in the system, likely related to her anemia

## 2024-12-12 NOTE — ED ADULT NURSE NOTE - NSFALLHARMRISKINTERV_ED_ALL_ED

## 2024-12-12 NOTE — ED PROVIDER NOTE - OBJECTIVE STATEMENT
84F with h/o iron deficiency presents with symptomatic anemia. patient had outpatient labs done and found to have hemoglobin of 5.2 outpatient. she reports fatigue, SHEEHAN, recently and some dark stools as well. no AC use. never had a blood transfusion in the past. last colonscopy was 9 years ago. denies chest pain or abdominal pain. she was sent to ED by her hematologist Dr. Parikh

## 2024-12-12 NOTE — H&P ADULT - NSICDXFAMILYHX_GEN_ALL_CORE_FT
FAMILY HISTORY:  Mother  Still living? Unknown  FH: aortic aneurysm, Age at diagnosis: Age Unknown    Sibling  Still living? Unknown  FH: lung cancer, Age at diagnosis: Age Unknown

## 2024-12-12 NOTE — PATIENT PROFILE ADULT - FUNCTIONAL ASSESSMENT - DAILY ACTIVITY 6.
I left a message that I ordered the Colonoscopy with Dr Patel.    4 = No assist / stand by assistance

## 2024-12-12 NOTE — H&P ADULT - NSHPPHYSICALEXAM_GEN_ALL_CORE
T(C): 36.7 (12-12-24 @ 14:35), Max: 36.7 (12-12-24 @ 14:35)  HR: 83 (12-12-24 @ 14:35) (83 - 83)  BP: 123/50 (12-12-24 @ 14:35) (123/50 - 123/50)  RR: 18 (12-12-24 @ 14:35) (18 - 18)  SpO2: 99% (12-12-24 @ 14:35) (99% - 99%)    GENERAL: patient appears well, no acute distress, appropriately interactive  EYES: sclera clear, no exudates  ENMT: oropharynx clear without erythema, no exudates, moist mucous membranes  NECK: supple, soft  LUNGS: good air entry bilaterally, clear to auscultation, symmetric breath sounds, no wheezing or rhonchi appreciated  HEART: soft S1/S2, regular rate and rhythm, no murmurs noted, no lower extremity edema  GASTROINTESTINAL: abdomen is soft, nontender, nondistended, normoactive bowel sounds  INTEGUMENT: good skin turgor, warm skin, appears well perfused  MUSCULOSKELETAL: no clubbing or cyanosis, no obvious deformity  NEUROLOGIC: awake, alert, oriented x3, good muscle tone in all 4 extremities  HEME/LYMPH: no obvious ecchymosis or petechiae T(C): 36.7 (12-12-24 @ 14:35), Max: 36.7 (12-12-24 @ 14:35)  HR: 83 (12-12-24 @ 14:35) (83 - 83)  BP: 123/50 (12-12-24 @ 14:35) (123/50 - 123/50)  RR: 18 (12-12-24 @ 14:35) (18 - 18)  SpO2: 99% (12-12-24 @ 14:35) (99% - 99%)    GENERAL: patient appears well, no acute distress, appropriately interactive  EYES: sclera clear, no exudates  ENMT:  moist mucous membranes  LUNGS: good air entry bilaterally, clear to auscultation, symmetric breath sounds, no wheezing or rhonchi appreciated  HEART: soft S1/S2, regular rate and rhythm, +systolic murmur, no lower extremity edema  GASTROINTESTINAL: abdomen is soft, nontender, nondistended, normoactive bowel sounds  INTEGUMENT: good skin turgor, warm skin, appears well perfused  MUSCULOSKELETAL: no obvious deformity  NEUROLOGIC: awake, alert, answers questions appropriately and follows commands   HEME/LYMPH: no obvious ecchymosis or petechiae

## 2024-12-12 NOTE — H&P ADULT - NSICDXPASTMEDICALHX_GEN_ALL_CORE_FT
PAST MEDICAL HISTORY:  H/O iron deficiency     H/O: depression     HLD (hyperlipidemia)     HTN (hypertension)

## 2024-12-12 NOTE — H&P ADULT - ASSESSMENT
84 year old woman with PMH of iron deficiency, HTN, HLD, and depression, who presents to the ED for a low hemoglobin, admitted for symptomatic anemia.

## 2024-12-12 NOTE — ED PROVIDER NOTE - CLINICAL SUMMARY MEDICAL DECISION MAKING FREE TEXT BOX
84F with h/o iron deficiency presents with symptomatic anemia. patient had outpatient labs done and found to have hemoglobin of 5.2 outpatient. she reports fatigue, SHEEHAN, recently and some dark stools as well. no AC use. never had a blood transfusion in the past. last colonscopy was 9 years ago. denies chest pain or abdominal pain. she was sent to ED by her hematologist Dr. Parikh     PE: Patient is well appearing, no acute distress, no signs of head trauma, pale, lungs clear bilaterally, abdomen is soft and nontender to palpation without guarding or rebound, normal pulses in all extremities    labs, imaging, monitoring, FOBT, consent for blood transfusion

## 2024-12-12 NOTE — ED PROVIDER NOTE - CARE PLAN
Principal Discharge DX:	Symptomatic anemia  Secondary Diagnosis:	UGIB (upper gastrointestinal bleed)   1

## 2024-12-13 ENCOUNTER — TRANSCRIPTION ENCOUNTER (OUTPATIENT)
Age: 84
End: 2024-12-13

## 2024-12-13 LAB
ALBUMIN SERPL ELPH-MCNC: 3.5 G/DL — SIGNIFICANT CHANGE UP (ref 3.3–5)
ALP SERPL-CCNC: 61 U/L — SIGNIFICANT CHANGE UP (ref 40–120)
ALT FLD-CCNC: 17 U/L — SIGNIFICANT CHANGE UP (ref 12–78)
ANION GAP SERPL CALC-SCNC: 2 MMOL/L — LOW (ref 5–17)
AST SERPL-CCNC: 20 U/L — SIGNIFICANT CHANGE UP (ref 15–37)
BASOPHILS # BLD AUTO: 0.05 K/UL — SIGNIFICANT CHANGE UP (ref 0–0.2)
BASOPHILS NFR BLD AUTO: 0.5 % — SIGNIFICANT CHANGE UP (ref 0–2)
BILIRUB SERPL-MCNC: 0.6 MG/DL — SIGNIFICANT CHANGE UP (ref 0.2–1.2)
BUN SERPL-MCNC: 15 MG/DL — SIGNIFICANT CHANGE UP (ref 7–23)
CALCIUM SERPL-MCNC: 9.7 MG/DL — SIGNIFICANT CHANGE UP (ref 8.5–10.1)
CHLORIDE SERPL-SCNC: 112 MMOL/L — HIGH (ref 96–108)
CHOLEST SERPL-MCNC: 127 MG/DL — SIGNIFICANT CHANGE UP
CO2 SERPL-SCNC: 29 MMOL/L — SIGNIFICANT CHANGE UP (ref 22–31)
CREAT SERPL-MCNC: 0.97 MG/DL — SIGNIFICANT CHANGE UP (ref 0.5–1.3)
EGFR: 58 ML/MIN/1.73M2 — LOW
EOSINOPHIL # BLD AUTO: 0.33 K/UL — SIGNIFICANT CHANGE UP (ref 0–0.5)
EOSINOPHIL NFR BLD AUTO: 3.3 % — SIGNIFICANT CHANGE UP (ref 0–6)
FERRITIN SERPL-MCNC: 11 NG/ML — LOW (ref 13–330)
FERRITIN SERPL-MCNC: 8 NG/ML — LOW (ref 13–330)
FOLATE SERPL-MCNC: >20 NG/ML — SIGNIFICANT CHANGE UP
GLUCOSE SERPL-MCNC: 100 MG/DL — HIGH (ref 70–99)
HCT VFR BLD CALC: 24.6 % — LOW (ref 34.5–45)
HCT VFR BLD CALC: 26.1 % — LOW (ref 34.5–45)
HCT VFR BLD CALC: 26.5 % — LOW (ref 34.5–45)
HDLC SERPL-MCNC: 36 MG/DL — LOW
HGB BLD-MCNC: 7.6 G/DL — LOW (ref 11.5–15.5)
HGB BLD-MCNC: 8.1 G/DL — LOW (ref 11.5–15.5)
HGB BLD-MCNC: 8.3 G/DL — LOW (ref 11.5–15.5)
IMM GRANULOCYTES NFR BLD AUTO: 0.5 % — SIGNIFICANT CHANGE UP (ref 0–0.9)
IRON SATN MFR SERPL: 21 UG/DL — LOW (ref 30–160)
IRON SATN MFR SERPL: 27 UG/DL — LOW (ref 30–160)
IRON SATN MFR SERPL: 5 % — LOW (ref 14–50)
IRON SATN MFR SERPL: 6 % — LOW (ref 14–50)
LIPID PNL WITH DIRECT LDL SERPL: 64 MG/DL — SIGNIFICANT CHANGE UP
LYMPHOCYTES # BLD AUTO: 1.79 K/UL — SIGNIFICANT CHANGE UP (ref 1–3.3)
LYMPHOCYTES # BLD AUTO: 17.8 % — SIGNIFICANT CHANGE UP (ref 13–44)
MCHC RBC-ENTMCNC: 24.9 PG — LOW (ref 27–34)
MCHC RBC-ENTMCNC: 30.9 G/DL — LOW (ref 32–36)
MCV RBC AUTO: 80.7 FL — SIGNIFICANT CHANGE UP (ref 80–100)
MONOCYTES # BLD AUTO: 0.96 K/UL — HIGH (ref 0–0.9)
MONOCYTES NFR BLD AUTO: 9.6 % — SIGNIFICANT CHANGE UP (ref 2–14)
NEUTROPHILS # BLD AUTO: 6.87 K/UL — SIGNIFICANT CHANGE UP (ref 1.8–7.4)
NEUTROPHILS NFR BLD AUTO: 68.3 % — SIGNIFICANT CHANGE UP (ref 43–77)
NON HDL CHOLESTEROL: 91 MG/DL — SIGNIFICANT CHANGE UP
NRBC # BLD: 0 /100 WBCS — SIGNIFICANT CHANGE UP (ref 0–0)
PLATELET # BLD AUTO: 351 K/UL — SIGNIFICANT CHANGE UP (ref 150–400)
POTASSIUM SERPL-MCNC: 5 MMOL/L — SIGNIFICANT CHANGE UP (ref 3.5–5.3)
POTASSIUM SERPL-SCNC: 5 MMOL/L — SIGNIFICANT CHANGE UP (ref 3.5–5.3)
PROT SERPL-MCNC: 7 G/DL — SIGNIFICANT CHANGE UP (ref 6–8.3)
RBC # BLD: 3.05 M/UL — LOW (ref 3.8–5.2)
RBC # FLD: 16.7 % — HIGH (ref 10.3–14.5)
SODIUM SERPL-SCNC: 143 MMOL/L — SIGNIFICANT CHANGE UP (ref 135–145)
TIBC SERPL-MCNC: 448 UG/DL — HIGH (ref 220–430)
TIBC SERPL-MCNC: 460 UG/DL — HIGH (ref 220–430)
TRANSFERRIN SERPL-MCNC: 361 MG/DL — HIGH (ref 200–360)
TRIGL SERPL-MCNC: 160 MG/DL — HIGH
UIBC SERPL-MCNC: 421 UG/DL — HIGH (ref 110–370)
UIBC SERPL-MCNC: 439 UG/DL — HIGH (ref 110–370)
VIT B12 SERPL-MCNC: 718 PG/ML — SIGNIFICANT CHANGE UP (ref 232–1245)
WBC # BLD: 10.05 K/UL — SIGNIFICANT CHANGE UP (ref 3.8–10.5)
WBC # FLD AUTO: 10.05 K/UL — SIGNIFICANT CHANGE UP (ref 3.8–10.5)

## 2024-12-13 PROCEDURE — 93010 ELECTROCARDIOGRAM REPORT: CPT

## 2024-12-13 PROCEDURE — 99233 SBSQ HOSP IP/OBS HIGH 50: CPT

## 2024-12-13 RX ORDER — LISINOPRIL 20 MG/1
20 TABLET ORAL DAILY
Refills: 0 | Status: DISCONTINUED | OUTPATIENT
Start: 2024-12-13 | End: 2024-12-16

## 2024-12-13 RX ORDER — CHOLECALCIFEROL (VITAMIN D3) 10MCG/0.25
1000 DROPS ORAL DAILY
Refills: 0 | Status: DISCONTINUED | OUTPATIENT
Start: 2024-12-13 | End: 2024-12-16

## 2024-12-13 RX ORDER — AMLODIPINE BESYLATE 10 MG/1
10 TABLET ORAL DAILY
Refills: 0 | Status: DISCONTINUED | OUTPATIENT
Start: 2024-12-13 | End: 2024-12-16

## 2024-12-13 RX ADMIN — LISINOPRIL 20 MILLIGRAM(S): 20 TABLET ORAL at 15:01

## 2024-12-13 RX ADMIN — PANTOPRAZOLE SODIUM 40 MILLIGRAM(S): 40 TABLET, DELAYED RELEASE ORAL at 05:07

## 2024-12-13 RX ADMIN — ACETAMINOPHEN, DIPHENHYDRAMINE HCL, PHENYLEPHRINE HCL 3 MILLIGRAM(S): 325; 25; 5 TABLET ORAL at 22:30

## 2024-12-13 RX ADMIN — PANTOPRAZOLE SODIUM 40 MILLIGRAM(S): 40 TABLET, DELAYED RELEASE ORAL at 17:52

## 2024-12-13 RX ADMIN — ROSUVASTATIN CALCIUM 20 MILLIGRAM(S): 5 TABLET, FILM COATED ORAL at 22:29

## 2024-12-13 RX ADMIN — VENLAFAXINE HYDROCHLORIDE 150 MILLIGRAM(S): 75 CAPSULE, EXTENDED RELEASE ORAL at 11:58

## 2024-12-13 RX ADMIN — AMLODIPINE BESYLATE 10 MILLIGRAM(S): 10 TABLET ORAL at 15:01

## 2024-12-13 NOTE — CARE COORDINATION ASSESSMENT. - NSPASTMEDSURGHISTORY_GEN_ALL_CORE_FT
PAST MEDICAL & SURGICAL HISTORY:  HTN (hypertension)      H/O iron deficiency      H/O: depression      HLD (hyperlipidemia)      No significant past surgical history

## 2024-12-13 NOTE — PHYSICAL THERAPY INITIAL EVALUATION ADULT - CRITERIA FOR SKILLED THERAPEUTIC INTERVENTIONS
rehab potential/therapy frequency/predicted duration of therapy intervention/anticipated equipment needs at discharge/anticipated discharge recommendation

## 2024-12-13 NOTE — SWALLOW BEDSIDE ASSESSMENT ADULT - ASR SWALLOW ASPIRATION MONITOR
Aspiration precautions-if any s/s penetration/aspiration noted, d/c PO & initiate NPO with SLP to reassess/change of breathing pattern/oral hygiene/position upright (90Y)/cough/gurgly voice/fever/pneumonia/throat clearing/upper respiratory infection

## 2024-12-13 NOTE — DISCHARGE NOTE PROVIDER - HOSPITAL COURSE
HPI:  84 year old woman with PMH of iron deficiency, HTN, HLD, and depression, who presents to the ED for a low hemoglobin. Patient had a routine appt with her hematologist today. Outpatient labs were done and he was found to have a hemoglobin of 5.2. Was sent to the ED by her hematologist Dr Parikh. She reports fatigue, SHEEHAN, and recently had some dark colored stools as well.  Patient has been feeling fatigued and SOB with exertion for the past 3 weeks. Normally able to ambulate without any issue/SOB, but recently has been SOB after walking 50 feet.  Reports she has episodes of black stool without any bright red blood; last dark BM was 3-4 days ago. States ~30% of her BMs are black every week. Had a BM today which she reports was normal in color. Pt attributes the dark BMs to her diet, which is high in tomatoes, spinach, and salads. Denies A/C use. Has never had a blood transfusion in the past. Previously in June pt had a black bowel movement, with bright red blood in the stool. Pt attributed this to drinking a large amount of tomato soup. Pt went to see her PCP and was found to have low iron and was given 2 doses of  Feraheme. Saw hematologist and was started on PO iron and then switched to iron infusions every 6 months, which was then increased to every 3 months, her last iron infusion was about 3 months ago. She was advised to see GI, but pt never did. Her last colonoscopy was 9 years ago, which she reports was normal.     Pt reports feelings of depression in her life. Reports her daughter passed away in 2019 and she has no family. Speaks with a therapist weekly.     Admits to fatigue, SHEEHAN, palpitations (w/ exertion), and numbness/tingling in b/l feet (chronic, hx of neuropathy)  Denies fever, chills, chest pain, cough, abdominal pain, nausea, vomiting, diarrhea, constipation, dysuria, headaches,  dizziness, hematuria, hematochezia, hematemesis.   Denies recent travel or recent antibiotic use.    ED Course:   Vitals: BP: 123/50, HR: 83, Temp: 98.1, RR: 18, SpO2: 99% on RA   Labs:   Hgb 5.3 Hct 18.3  FOBT positive  CXR: 1. Minimal linear atelectasis on the left with no infiltrates, pleural effusions or CHF. 2. Atherosclerosis as noted. 3. Degenerative changes of the thoracic spine.   EKG: NSR @ 83 bpm, marked ST abnormality (possible inferior subendocardial injury), Qtc 439  Received in the ED: Protonix 40 mg IV x 1, 2U pRBCs   (12 Dec 2024 18:11)      ---  HOSPITAL COURSE:   84 year old woman with PMH of iron deficiency, HTN, HLD, and depression, who presents to the ED for a low hemoglobin, admitted for symptomatic anemia and GI bleed. Hgb 5.3 FOBT + on admission; s/p protonix 40 IV x 1 and 2U pRBCs in ED.  Hgb improved appropriately to 7.6. pt was started on clear liquid diet, which was advanced.          Patient is stable for discharge as per primary medical team and consultants.    PT consulted, recommends discharge ______    Patient showed improvement throughout hospitalization. Patient was seen and examined on day of discharge. Patient was medically optimized for discharge with close outpatient follow up.        ---  CONSULTANTS:   - GI consulted, Dr. Lawson  - [place] consulted, Dr. Parikh  ---  TIME SPENT:  I, the attending physician, was physically present for the key portions of the evaluation and management (E/M) service provided. The total amount of time spent reviewing the hospital notes, laboratory values, imaging findings, assessing/counseling the patient, discussing with consultant physicians, social work, nursing staff was -- minutes    ---  Primary care provider was made aware of plan for discharge:      [  ] NO     [  ] YES   HPI:  84 year old woman with PMH of iron deficiency, HTN, HLD, and depression, who presents to the ED for a low hemoglobin. Patient had a routine appt with her hematologist today. Outpatient labs were done and he was found to have a hemoglobin of 5.2. Was sent to the ED by her hematologist Dr Parikh. She reports fatigue, SHEEHAN, and recently had some dark colored stools as well.  Patient has been feeling fatigued and SOB with exertion for the past 3 weeks. Normally able to ambulate without any issue/SOB, but recently has been SOB after walking 50 feet.  Reports she has episodes of black stool without any bright red blood; last dark BM was 3-4 days ago. States ~30% of her BMs are black every week. Had a BM today which she reports was normal in color. Pt attributes the dark BMs to her diet, which is high in tomatoes, spinach, and salads. Denies A/C use. Has never had a blood transfusion in the past. Previously in June pt had a black bowel movement, with bright red blood in the stool. Pt attributed this to drinking a large amount of tomato soup. Pt went to see her PCP and was found to have low iron and was given 2 doses of  Feraheme. Saw hematologist and was started on PO iron and then switched to iron infusions every 6 months, which was then increased to every 3 months, her last iron infusion was about 3 months ago. She was advised to see GI, but pt never did. Her last colonoscopy was 9 years ago, which she reports was normal.     Pt reports feelings of depression in her life. Reports her daughter passed away in 2019 and she has no family. Speaks with a therapist weekly.     Admits to fatigue, SHEEHAN, palpitations (w/ exertion), and numbness/tingling in b/l feet (chronic, hx of neuropathy)  Denies fever, chills, chest pain, cough, abdominal pain, nausea, vomiting, diarrhea, constipation, dysuria, headaches,  dizziness, hematuria, hematochezia, hematemesis.   Denies recent travel or recent antibiotic use.    ED Course:   Vitals: BP: 123/50, HR: 83, Temp: 98.1, RR: 18, SpO2: 99% on RA   Labs:   Hgb 5.3 Hct 18.3  FOBT positive  CXR: 1. Minimal linear atelectasis on the left with no infiltrates, pleural effusions or CHF. 2. Atherosclerosis as noted. 3. Degenerative changes of the thoracic spine.   EKG: NSR @ 83 bpm, marked ST abnormality (possible inferior subendocardial injury), Qtc 439  Received in the ED: Protonix 40 mg IV x 1, 2U pRBCs   (12 Dec 2024 18:11)      ---  HOSPITAL COURSE:   84 year old woman with PMH of iron deficiency, HTN, HLD, and depression, who presents to the ED for a low hemoglobin, admitted for symptomatic anemia and GI bleed. Hgb 5.3 FOBT + on admission; s/p protonix 40 IV x 1 and 2U pRBCs in ED.  Hgb improved appropriately to 7.6. pt was started on clear liquid diet, which was advanced. Her hemoglobin was stable. She was evaluated by GI who decided to do endoscopy ____          Patient is stable for discharge as per primary medical team and consultants.    PT consulted, recommends discharge ______    Patient showed improvement throughout hospitalization. Patient was seen and examined on day of discharge. Patient was medically optimized for discharge with close outpatient follow up.        ---  CONSULTANTS:   - GI consulted, Dr. Sheikh Martir Leary consulted, Dr. Parikh  ---  TIME SPENT:  I, the attending physician, was physically present for the key portions of the evaluation and management (E/M) service provided. The total amount of time spent reviewing the hospital notes, laboratory values, imaging findings, assessing/counseling the patient, discussing with consultant physicians, social work, nursing staff was -- minutes    ---  Primary care provider was made aware of plan for discharge:      [  ] NO     [  ] YES   HPI:  84 year old woman with PMH of iron deficiency, HTN, HLD, and depression, who presents to the ED for a low hemoglobin. Patient had a routine appt with her hematologist today. Outpatient labs were done and he was found to have a hemoglobin of 5.2. Was sent to the ED by her hematologist Dr Parikh. She reports fatigue, SHEEHAN, and recently had some dark colored stools as well.  Patient has been feeling fatigued and SOB with exertion for the past 3 weeks. Normally able to ambulate without any issue/SOB, but recently has been SOB after walking 50 feet.  Reports she has episodes of black stool without any bright red blood; last dark BM was 3-4 days ago. States ~30% of her BMs are black every week. Had a BM today which she reports was normal in color. Pt attributes the dark BMs to her diet, which is high in tomatoes, spinach, and salads. Denies A/C use. Has never had a blood transfusion in the past. Previously in June pt had a black bowel movement, with bright red blood in the stool. Pt attributed this to drinking a large amount of tomato soup. Pt went to see her PCP and was found to have low iron and was given 2 doses of  Feraheme. Saw hematologist and was started on PO iron and then switched to iron infusions every 6 months, which was then increased to every 3 months, her last iron infusion was about 3 months ago. She was advised to see GI, but pt never did. Her last colonoscopy was 9 years ago, which she reports was normal.     Pt reports feelings of depression in her life. Reports her daughter passed away in 2019 and she has no family. Speaks with a therapist weekly.     Admits to fatigue, SHEEHAN, palpitations (w/ exertion), and numbness/tingling in b/l feet (chronic, hx of neuropathy)  Denies fever, chills, chest pain, cough, abdominal pain, nausea, vomiting, diarrhea, constipation, dysuria, headaches,  dizziness, hematuria, hematochezia, hematemesis.   Denies recent travel or recent antibiotic use.    ED Course:   Vitals: BP: 123/50, HR: 83, Temp: 98.1, RR: 18, SpO2: 99% on RA   Labs:   Hgb 5.3 Hct 18.3  FOBT positive  CXR: 1. Minimal linear atelectasis on the left with no infiltrates, pleural effusions or CHF. 2. Atherosclerosis as noted. 3. Degenerative changes of the thoracic spine.   EKG: NSR @ 83 bpm, marked ST abnormality (possible inferior subendocardial injury), Qtc 439  Received in the ED: Protonix 40 mg IV x 1, 2U pRBCs   (12 Dec 2024 18:11)      ---  HOSPITAL COURSE:   84 year old woman with PMH of iron deficiency, HTN, HLD, and depression, who presents to the ED for a low hemoglobin, admitted for symptomatic anemia and GI bleed. Hgb 5.3 FOBT + on admission; s/p protonix 40 IV x 1 and 2U pRBCs in ED. Pt was started on IV PPI. Diet was advanced as tolerated. Iron studies compatible with iron deficiency, given IV venofer. Hg improved throughout admission. She was evaluated by GI who decided to do endoscopy which revealed ***    Patient is stable for discharge as per primary medical team and consultants.    PT consulted, recommends discharge ______    Patient showed improvement throughout hospitalization. Patient was seen and examined on day of discharge. Patient was medically optimized for discharge with close outpatient follow up.        ---  CONSULTANTS:   - GI consulted, Dr. Sheikh Martir Leary consulted, Dr. Parikh  ---  TIME SPENT:  I, the attending physician, was physically present for the key portions of the evaluation and management (E/M) service provided. The total amount of time spent reviewing the hospital notes, laboratory values, imaging findings, assessing/counseling the patient, discussing with consultant physicians, social work, nursing staff was -- minutes    ---  Primary care provider was made aware of plan for discharge:      [  ] NO     [  ] YES   HPI:  84 year old woman with PMH of iron deficiency, HTN, HLD, and depression, who presents to the ED for a low hemoglobin. Patient had a routine appt with her hematologist today. Outpatient labs were done and he was found to have a hemoglobin of 5.2. Was sent to the ED by her hematologist Dr Parikh. She reports fatigue, SHEEHAN, and recently had some dark colored stools as well.  Patient has been feeling fatigued and SOB with exertion for the past 3 weeks. Normally able to ambulate without any issue/SOB, but recently has been SOB after walking 50 feet.  Reports she has episodes of black stool without any bright red blood; last dark BM was 3-4 days ago. States ~30% of her BMs are black every week. Had a BM today which she reports was normal in color. Pt attributes the dark BMs to her diet, which is high in tomatoes, spinach, and salads. Denies A/C use. Has never had a blood transfusion in the past. Previously in June pt had a black bowel movement, with bright red blood in the stool. Pt attributed this to drinking a large amount of tomato soup. Pt went to see her PCP and was found to have low iron and was given 2 doses of  Feraheme. Saw hematologist and was started on PO iron and then switched to iron infusions every 6 months, which was then increased to every 3 months, her last iron infusion was about 3 months ago. She was advised to see GI, but pt never did. Her last colonoscopy was 9 years ago, which she reports was normal.     Pt reports feelings of depression in her life. Reports her daughter passed away in 2019 and she has no family. Speaks with a therapist weekly.     Admits to fatigue, SHEEHAN, palpitations (w/ exertion), and numbness/tingling in b/l feet (chronic, hx of neuropathy)  Denies fever, chills, chest pain, cough, abdominal pain, nausea, vomiting, diarrhea, constipation, dysuria, headaches,  dizziness, hematuria, hematochezia, hematemesis.   Denies recent travel or recent antibiotic use.    ED Course:   Vitals: BP: 123/50, HR: 83, Temp: 98.1, RR: 18, SpO2: 99% on RA   Labs:   Hgb 5.3 Hct 18.3  FOBT positive  CXR: 1. Minimal linear atelectasis on the left with no infiltrates, pleural effusions or CHF. 2. Atherosclerosis as noted. 3. Degenerative changes of the thoracic spine.   EKG: NSR @ 83 bpm, marked ST abnormality (possible inferior subendocardial injury), Qtc 439  Received in the ED: Protonix 40 mg IV x 1, 2U pRBCs   (12 Dec 2024 18:11)      ---  HOSPITAL COURSE:   84 year old woman with PMH of iron deficiency, HTN, HLD, and depression, who presents to the ED for a low hemoglobin, admitted for symptomatic anemia and GI bleed. Hgb 5.3 FOBT + on admission; s/p protonix 40 IV x 1 and 2U pRBCs in ED. Pt was started on IV PPI. Diet was advanced as tolerated. Iron studies compatible with iron deficiency, given IV venofer. Hg improved throughout admission. She was evaluated by GI and patient underwent endoscopy on 12/16.     Patient is stable for discharge as per primary medical team and consultants.    PT consulted, recommends discharge home.    Patient showed improvement throughout hospitalization. Patient was seen and examined on day of discharge. Patient was medically optimized for discharge with close outpatient follow up.    VITALS:   T(C): 37.2 (12-16-24 @ 12:20), Max: 37.2 (12-16-24 @ 11:10)  HR: 66 (12-16-24 @ 12:20) (66 - 88)  BP: 115/43 (12-16-24 @ 12:20) (115/43 - 159/61)  RR: 17 (12-16-24 @ 12:20) (17 - 18)  SpO2: 97% (12-16-24 @ 12:20) (92% - 99%)    GENERAL: NAD  HEENT:  anicteric, moist mucous membranes  CHEST/LUNG:  CTA b/l  HEART:  RRR, S1, S2  ABDOMEN:  BS+, soft, nontender  EXTREMITIES: no edema b/l  NERVOUS SYSTEM: answers questions and follows commands appropriately    ---  CONSULTANTS:   - GI (Dr. Lawson)  - Heme (Dr. Parikh)  ---  TIME SPENT:  I, the attending physician, was physically present for the key portions of the evaluation and management (E/M) service provided. The total amount of time spent reviewing the hospital notes, laboratory values, imaging findings, assessing/counseling the patient, discussing with consultant physicians, social work, nursing staff was -- minutes    ---  Primary care provider was made aware of plan for discharge:      [  ] NO     [  ] YES   HPI:  84 year old woman with PMH of iron deficiency, HTN, HLD, and depression, who presents to the ED for a low hemoglobin. Patient had a routine appt with her hematologist today. Outpatient labs were done and he was found to have a hemoglobin of 5.2. Was sent to the ED by her hematologist Dr Parikh. She reports fatigue, SHEEHAN, and recently had some dark colored stools as well.  Patient has been feeling fatigued and SOB with exertion for the past 3 weeks. Normally able to ambulate without any issue/SOB, but recently has been SOB after walking 50 feet.  Reports she has episodes of black stool without any bright red blood; last dark BM was 3-4 days ago. States ~30% of her BMs are black every week. Had a BM today which she reports was normal in color. Pt attributes the dark BMs to her diet, which is high in tomatoes, spinach, and salads. Denies A/C use. Has never had a blood transfusion in the past. Previously in June pt had a black bowel movement, with bright red blood in the stool. Pt attributed this to drinking a large amount of tomato soup. Pt went to see her PCP and was found to have low iron and was given 2 doses of  Feraheme. Saw hematologist and was started on PO iron and then switched to iron infusions every 6 months, which was then increased to every 3 months, her last iron infusion was about 3 months ago. She was advised to see GI, but pt never did. Her last colonoscopy was 9 years ago, which she reports was normal.     Pt reports feelings of depression in her life. Reports her daughter passed away in 2019 and she has no family. Speaks with a therapist weekly.     Admits to fatigue, SHEEHAN, palpitations (w/ exertion), and numbness/tingling in b/l feet (chronic, hx of neuropathy)  Denies fever, chills, chest pain, cough, abdominal pain, nausea, vomiting, diarrhea, constipation, dysuria, headaches,  dizziness, hematuria, hematochezia, hematemesis.   Denies recent travel or recent antibiotic use.    ED Course:   Vitals: BP: 123/50, HR: 83, Temp: 98.1, RR: 18, SpO2: 99% on RA   Labs:   Hgb 5.3 Hct 18.3  FOBT positive  CXR: 1. Minimal linear atelectasis on the left with no infiltrates, pleural effusions or CHF. 2. Atherosclerosis as noted. 3. Degenerative changes of the thoracic spine.   EKG: NSR @ 83 bpm, marked ST abnormality (possible inferior subendocardial injury), Qtc 439  Received in the ED: Protonix 40 mg IV x 1, 2U pRBCs   (12 Dec 2024 18:11)      ---  HOSPITAL COURSE:   84 year old woman with PMH of iron deficiency, HTN, HLD, and depression, who presents to the ED for a low hemoglobin, admitted for symptomatic anemia and GI bleed. Hgb 5.3 FOBT + on admission; s/p protonix 40 IV x 1 and 2U pRBCs in ED. Pt was started on IV PPI. Diet was advanced as tolerated. Iron studies compatible with iron deficiency, given IV venofer. Hg improved throughout admission. She was evaluated by GI and patient underwent endoscopy on 12/16. + gastric polyp which was biopsied. + duodenitis     Patient is stable for discharge as per primary medical team and consultants.    PT consulted, recommends discharge home.    Patient showed improvement throughout hospitalization. Patient was seen and examined on day of discharge. Patient was medically optimized for discharge with close outpatient follow up.    VITALS:   T(C): 37.2 (12-16-24 @ 12:20), Max: 37.2 (12-16-24 @ 11:10)  HR: 66 (12-16-24 @ 12:20) (66 - 88)  BP: 115/43 (12-16-24 @ 12:20) (115/43 - 159/61)  RR: 17 (12-16-24 @ 12:20) (17 - 18)  SpO2: 97% (12-16-24 @ 12:20) (92% - 99%)    GENERAL: NAD  HEENT:  anicteric, moist mucous membranes  CHEST/LUNG:  CTA b/l  HEART:  RRR, S1, S2  ABDOMEN:  BS+, soft, nontender  EXTREMITIES: no edema b/l  NERVOUS SYSTEM: answers questions and follows commands appropriately    ---  CONSULTANTS:   - GI (Dr. Lawson)  - Heme (Dr. Parikh)  ---  TIME SPENT:  I, the attending physician, was physically present for the key portions of the evaluation and management (E/M) service provided. The total amount of time spent reviewing the hospital notes, laboratory values, imaging findings, assessing/counseling the patient, discussing with consultant physicians, social work, nursing staff was 50 minutes

## 2024-12-13 NOTE — SWALLOW BEDSIDE ASSESSMENT ADULT - COMMENTS
Chart reviewed order received for swallow eval.  Pt cleared by Resident and GI PA to take small amounts of solids for eval.  Pt received upright on bedside chair A&A Ox4, on RA, pain scale 0/10 pre & post eval.  Swallow eval completed see below for details.  Pt educated on rx's, verbalized understanding.  Pt left as received NAD NANDO Mcbride & Resident [phone] notified.  Will follow.     Per charting, pt is a "84 year old woman with PMH of iron deficiency, HTN, HLD, and depression, who presents to the ED for a low hemoglobin, admitted for symptomatic anemia and GI bleed."    Chest xray 12/12/24: "IMPRESSION:  1. Minimal linear atelectasis on the left with no infiltrates, pleural   effusions or CHF.  2. Atherosclerosis as noted.  3. Degenerative changes of the thoracic spine."

## 2024-12-13 NOTE — PROGRESS NOTE ADULT - PROBLEM SELECTOR PLAN 1
pt w/ SHEEHAN and fatigue, hx of iron deficiency on iron infusions   - Hgb 5.3 FOBT + on admission; s/p protonix 40 IV x 1 and 2U pRBCs in ED  - Hgb improved appropriately to 7.6 this AM  - FU iron panel  - monitor daily CBC  - maintain active type+screen   - c/w Protonix 40 mg IV bid  - clear liquid diet  - GI consulted, Dr. Lawson, f/u recs, possible endoscopy  - Heme consulted, Dr. Parikh, f/u recs

## 2024-12-13 NOTE — CONSULT NOTE ADULT - SUBJECTIVE AND OBJECTIVE BOX
[facility]  Cyrus Stewart PA-C  [facility] [place] [address]  [phone]      Chief Complaint:  Patient is a 84y old  Female who presents with a chief complaint of anemia (12 Dec 2024 18:11)      HPI:  84 year old woman with PMH of iron deficiency, HTN, HLD, and depression, who presents to the ED for a low hemoglobin. Patient had a routine appt with her hematologist today. Outpatient labs were done and he was found to have a hemoglobin of 5.2. Was sent to the ED by her hematologist Dr Parikh. She reports fatigue, SHEEHAN, and recently had some dark colored stools as well.  Patient has been feeling fatigued and SOB with exertion for the past 3 weeks. Normally able to ambulate without any issue/SOB, but recently has been SOB after walking 50 feet.  Reports she has episodes of black stool without any bright red blood; last dark BM was 3-4 days ago. States ~30% of her BMs are black every week. Had a BM today which she reports was normal in color. Pt attributes the dark BMs to her diet, which is high in tomatoes, spinach, and salads. Denies A/C use. Has never had a blood transfusion in the past. Previously in  pt had a black bowel movement, with bright red blood in the stool. Pt attributed this to drinking a large amount of tomato soup. Pt went to see her PCP and was found to have low iron and was given 2 doses of  Feraheme. Saw hematologist and was started on PO iron and then switched to iron infusions every 6 months, which was then increased to every 3 months, her last iron infusion was about 3 months ago. She was advised to see GI, but pt never did. Her last colonoscopy was 9 years ago, which she reports was normal.     INTERVAL HPI  Pt s/e  Discussed same hx as above  Pt's last colonoscopy 9 years ago no reported abnormalities. She denies prior hx of endoscopy  Pt is getting IV iron infusions every 3 months  No a/c use  Pt had a normal brown BM yesterday. She recalls having a dark BM intermittently but last time was about 3-4 days ago  Denies recent NSAID use    Allergies:  penicillin (Hives)  clindamycin (Hives)  sulfa drugs (Hives)      Medications:  acetaminophen     Tablet .. 650 milliGRAM(s) Oral every 6 hours PRN  aluminum hydroxide/magnesium hydroxide/simethicone Suspension 30 milliLiter(s) Oral every 4 hours PRN  amLODIPine   Tablet 10 milliGRAM(s) Oral daily  influenza  Vaccine (HIGH DOSE) 0.5 milliLiter(s) IntraMuscular once  lisinopril 20 milliGRAM(s) Oral daily  melatonin 3 milliGRAM(s) Oral at bedtime PRN  ondansetron Injectable 4 milliGRAM(s) IV Push every 8 hours PRN  pantoprazole  Injectable 40 milliGRAM(s) IV Push two times a day  rosuvastatin 20 milliGRAM(s) Oral at bedtime  venlafaxine XR. 150 milliGRAM(s) Oral daily      PMHX/PSHX:  HTN (hypertension)    HLD (hyperlipidemia)    H/O: depression    H/O iron deficiency    No significant past surgical history        Family history:  FH: lung cancer (Sibling)    FH: aortic aneurysm (Mother)      ROS:   General:  +fatigue. No fevers, chills, night sweats  Eyes:  Good vision, no reported pain  ENT:  No sore throat, pain, runny nose, dysphagia  CV:  No pain, palpitations, hypo/hypertension  Resp:  No dyspnea, cough, tachypnea, wheezing  GI:  No pain, No nausea, No vomiting, No diarrhea, No constipation, No weight loss, No fever, No pruritis, No rectal bleeding, No tarry stools, No dysphagia,  :  No pain, bleeding, incontinence, nocturia  Muscle:  No pain, weakness  Neuro:  No weakness, tingling, memory problems  Psych:  No fatigue, insomnia, mood problems, depression  Endocrine:  No polyuria, polydipsia, cold/heat intolerance  Heme:  No petechiae, ecchymosis, easy bruisability  Skin:  No rash, tattoos, scars, edema      PHYSICAL EXAM:   Vital Signs:  Vital Signs Last 24 Hrs  T(C): 37.1 (13 Dec 2024 04:45), Max: 37.2 (12 Dec 2024 20:10)  T(F): 98.7 (13 Dec 2024 04:45), Max: 98.9 (12 Dec 2024 20:10)  HR: 74 (13 Dec 2024 04:45) (74 - 85)  BP: 158/64 (13 Dec 2024 04:45) (123/50 - 158/71)  BP(mean): 86 (12 Dec 2024 20:25) (86 - 86)  RR: 18 (13 Dec 2024 04:45) (17 - 19)  SpO2: 95% (13 Dec 2024 04:45) (93% - 99%)    Parameters below as of 13 Dec 2024 04:45  Patient On (Oxygen Delivery Method): room air      Daily Height in cm: 160.02 (12 Dec 2024 14:35)    Daily Weight in k.8 (13 Dec 2024 04:34)    GENERAL:  Appears stated age  HEENT:  NC/AT  CHEST:  Full & symmetric excursion  HEART:  Regular rhythm  ABDOMEN:  Soft, non-tender, non-distended  EXTEREMITIES:  no cyanosis, clubbing or edema  SKIN:  No rash  NEURO:  Alert    LABS:                        7.6    10.05 )-----------( 351      ( 13 Dec 2024 07:40 )             24.6     12-    143  |  112[H]  |  15  ----------------------------<  100[H]  5.0   |  29  |  0.97    Ca    9.7      13 Dec 2024 07:40  Mg     2.1     12-12    TPro  7.0  /  Alb  3.5  /  TBili  0.6  /  DBili  x   /  AST  20  /  ALT  17  /  AlkPhos  61  12-13    LIVER FUNCTIONS - ( 13 Dec 2024 07:40 )  Alb: 3.5 g/dL / Pro: 7.0 g/dL / ALK PHOS: 61 U/L / ALT: 17 U/L / AST: 20 U/L / GGT: x           PT/INR - ( 12 Dec 2024 16:52 )   PT: 13.6 sec;   INR: 1.15 ratio         PTT - ( 12 Dec 2024 16:52 )  PTT:29.9 sec  Urinalysis Basic - ( 13 Dec 2024 07:40 )    Color: x / Appearance: x / SG: x / pH: x  Gluc: 100 mg/dL / Ketone: x  / Bili: x / Urobili: x   Blood: x / Protein: x / Nitrite: x   Leuk Esterase: x / RBC: x / WBC x   Sq Epi: x / Non Sq Epi: x / Bacteria: x  
All records reviewed.    HPI:    85 yo woman follows in our office since 10/2023 for iron deficiency anemia good response to IV iron(Ferahme), last dose 9/2024 adn CBC 10/2024r Hgb 11.6 mcv 93.8, but when seen in office 12/12, hgb 5.2 wbc 11.14 plts 417 mcv 79.7 and was sent to ER  Pt reports intermittent black stool but attributed to her food intake of spinacha dn tomatoes, has been instructed to see GI but has not  Admits to 3wks incr SOB, SHEEHAN, fatigue  had episode 6/2024 w black stool as wwell as blood in stool  No abdomen pain  CBC in ER 12/12 wbc 10.44 hgb 5.3 hct 18.3 mcv 78.9 plts 397 rbc 2.32  Transfused 2U PRBC, feeling improved  Stool occult blood positive  12/13 hgb 8.1    PAST MEDICAL & SURGICAL HISTORY:  HTN (hypertension)      HLD (hyperlipidemia)      H/O: depression      H/O iron deficiency      No significant past surgical history          Review of System:  see above    MEDICATIONS  (STANDING):  amLODIPine   Tablet 10 milliGRAM(s) Oral daily  influenza  Vaccine (HIGH DOSE) 0.5 milliLiter(s) IntraMuscular once  lisinopril 20 milliGRAM(s) Oral daily  pantoprazole  Injectable 40 milliGRAM(s) IV Push two times a day  rosuvastatin 20 milliGRAM(s) Oral at bedtime  venlafaxine XR. 150 milliGRAM(s) Oral daily    MEDICATIONS  (PRN):  acetaminophen     Tablet .. 650 milliGRAM(s) Oral every 6 hours PRN Temp greater or equal to 38C (100.4F), Mild Pain (1 - 3)  aluminum hydroxide/magnesium hydroxide/simethicone Suspension 30 milliLiter(s) Oral every 4 hours PRN Dyspepsia  melatonin 3 milliGRAM(s) Oral at bedtime PRN Insomnia  ondansetron Injectable 4 milliGRAM(s) IV Push every 8 hours PRN Nausea and/or Vomiting      Allergies    penicillin (Hives)  clindamycin (Hives)  sulfa drugs (Hives)    Intolerances        SOCIAL HISTORY:  denies Etoh or Tobacco    FAMILY HISTORY:  FH: lung cancer (Sibling)    FH: aortic aneurysm (Mother)        Vital Signs Last 24 Hrs  T(C): 36.8 (13 Dec 2024 11:43), Max: 37.2 (12 Dec 2024 20:10)  T(F): 98.3 (13 Dec 2024 11:43), Max: 98.9 (12 Dec 2024 20:10)  HR: 63 (13 Dec 2024 14:50) (63 - 85)  BP: 139/67 (13 Dec 2024 14:50) (139/67 - 158/71)  BP(mean): 86 (12 Dec 2024 20:25) (86 - 86)  RR: 18 (13 Dec 2024 11:43) (17 - 19)  SpO2: 97% (13 Dec 2024 11:43) (93% - 98%)    Parameters below as of 13 Dec 2024 11:43  Patient On (Oxygen Delivery Method): room air        PHYSICAL EXAM:      General: up in chair, in no acute distress  Eyes:sclera anicteric, pupils equal and EOMI  ENMT:buccal mucosa moist  Neck:supple, trachea midline  Lungs:clear, no wheeze/rhonchi  Cardiovascular:regular rate and rhythm, S1 S2  Abdomen:soft, nontender, no organomegaly present, bowel sounds normal  Neurological:alert and oriented x3, Cranial Nerves II-XII grossly intact  Skin:no increased ecchymosis/petechiae/purpura  Lymph Nodes:no palpable cervical/supraclavicular lymph nodes enlargements  Extremities:no cyanosis/clubbing/edema        LABS:                        8.1    x     )-----------( x        ( 13 Dec 2024 14:30 )             26.1     12-13 @ 14:30  WBC--  RBC-- Hgb8.1 Hct26.1  MCV--  Plts--  Auto Neutro-- Band-- Auto Lymph-- Atypical Lymph-- Reactive Lymph-- Auto Mono-- Auto Eos-- Auto Baso--        12-13 @ 07:40  WBC10.05  RBC3.05 Hgb7.6 Hct24.6  MCV80.7  Keic268  Auto Oimhxg92.3 Band-- Auto Lymph17.8 Atypical Lymph-- Reactive Lymph-- Auto Mono9.6 Auto Eos3.3 Auto Baso0.5        12-12 @ 16:52  WBC10.44  RBC2.32 Hgb5.3 Hct18.3  MCV78.9  Vjbr549  Auto Nryxpy98.3 Band-- Auto Lymph11.6 Atypical Lymph-- Reactive Lymph-- Auto Mono6.7 Auto Eos1.1 Auto Baso0.4          12-13    143  |  112[H]  |  15  ----------------------------<  100[H]  5.0   |  29  |  0.97    Ca    9.7      13 Dec 2024 07:40  Mg     2.1     12-12    TPro  7.0  /  Alb  3.5  /  TBili  0.6  /  DBili  x   /  AST  20  /  ALT  17  /  AlkPhos  61  12-13 12-12 @ 16:52  PT13.6 INR1.15  PTT29.9    Urinalysis Basic - ( 13 Dec 2024 07:40 )    Color: x / Appearance: x / SG: x / pH: x  Gluc: 100 mg/dL / Ketone: x  / Bili: x / Urobili: x   Blood: x / Protein: x / Nitrite: x   Leuk Esterase: x / RBC: x / WBC x   Sq Epi: x / Non Sq Epi: x / Bacteria: x        PERIPHERAL BLOOD SMEAR REVIEW:      RADIOLOGY & ADDITIONAL STUDIES:

## 2024-12-13 NOTE — SWALLOW BEDSIDE ASSESSMENT ADULT - SWALLOW EVAL: RECOMMENDED FEEDING/EATING TECHNIQUES
No verbalizing when eating/drinking; Consider taking meds 1 pill at a time or crush meds in applesauce, as feasible, prn/oral hygiene/position upright (90 degrees)/small sips/bites

## 2024-12-13 NOTE — DISCHARGE NOTE PROVIDER - INSTRUCTIONS
No tomatoes GERD diet   Foods That May Cause Heartburn  Foods that are high in fat, salt or spice such as: Fried food, Fast food, Pizza, Potato chips and other processed snacks, Chili powder and pepper (white, black, cayenne), Fatty meats such as rios and sausage, Cheese  Other foods that can cause the same problem include:  Tomato-based sauces, Citrus fruits, Chocolate, Peppermint, Carbonated beverages

## 2024-12-13 NOTE — PROVIDER CONTACT NOTE (CHANGE IN STATUS NOTIFICATION) - ASSESSMENT
Pt's vitals as charted, NSR on the tele monitor, however pt endorses weakness, dizziness, palpitations, and appears more pale.

## 2024-12-13 NOTE — SOCIAL WORK PROGRESS NOTE - NSSWPROGRESSNOTE_GEN_ALL_CORE
SW consult received, 85 y/o female admitted from home with low hemoglobin. Pt is a&ox4, indep with ADLS, lives in senior housing complex. Pt has h/o depression r/t loss of daughter in 2019, reports seeing therapist weekly in community. Supportive counseling provided and SW will remain available for follow up as needed. No anticipated DC needs at this time.

## 2024-12-13 NOTE — SWALLOW BEDSIDE ASSESSMENT ADULT - SLP PERTINENT HISTORY OF CURRENT PROBLEM
Per RN report, pt with +coughing after taking meds.  Pt reported "sometimes her flap doesn't work", reported intermittent coughing when eating/drinking.

## 2024-12-13 NOTE — DISCHARGE NOTE PROVIDER - CARE PROVIDER_API CALL
nathalia haney  Phone: (   )    -  Fax: (   )    -  Established Patient  Follow Up Time: 1 week    Cleveland Parikh  Medical Oncology  [address] [facility], [address] [place] [id number]  Phone: [phone]  Fax: [phone]  Established Patient  Follow Up Time: 1 week   Cleveland Parikh  Medical Oncology  [address] [facility], [address]  [place] [address]  Phone: [phone]  Fax: [phone]  Established Patient  Follow Up Time: 1 week    nathalia haney  Phone: (   )    -  Fax: (   )    -  Established Patient  Follow Up Time: 1 week    Satinder Lawson  Gastroenterology  [address] [place] [id number]  Phone: [phone]  Fax: [phone]  Follow Up Time: 1 week

## 2024-12-13 NOTE — PROGRESS NOTE ADULT - PROBLEM SELECTOR PLAN 5
DVT ppx: SCDs; hold a/c in setting of suspected bleed DVT ppx: SCDs; hold a/c in setting of suspected bleed      Updated pt's friend Tamela on 12/13

## 2024-12-13 NOTE — SWALLOW BEDSIDE ASSESSMENT ADULT - SWALLOW EVAL: DIAGNOSIS
Oral & pharyngeal stages deemed functional, no overt s/s penetration/aspiration noted, however, given pt report of intermittently coughing with PO, rx to consider MBS for objective view of pharyngeal stage when pt completes GI workup.  Pt reported she will consider MBS, however, will monitor her swallowing for now, Resident [phone] notified.

## 2024-12-13 NOTE — PHYSICAL THERAPY INITIAL EVALUATION ADULT - GENERAL OBSERVATIONS, REHAB EVAL
Patient chart reviewed, events noted. Patient cleared to be seen for therapy by RN prior to session. Pt received care in hospital room. Met patient sitting in chair with a remote tele, ID band, and an allergy band while in NAD.

## 2024-12-13 NOTE — CARE COORDINATION ASSESSMENT. - RETURN TO PRIOR LIVING ARRANGEMENTS
Pt resting on 15L NC. Family at beside. Oral care and roberts care preformed. med's given per peg tube. Yes

## 2024-12-13 NOTE — PHYSICAL THERAPY INITIAL EVALUATION ADULT - ADDITIONAL COMMENTS
Patient lives alone in a single level home within a senior living facility. She was independent with all ADLs and does not use an AD prior to admission.

## 2024-12-13 NOTE — PROVIDER CONTACT NOTE (CHANGE IN STATUS NOTIFICATION) - ACTION/TREATMENT ORDERED:
STAT CBC ordered, STAT EKG, pt instructed to ambulate with assistance only and provider to come to the bedside. normal (ped)...

## 2024-12-13 NOTE — CARE COORDINATION ASSESSMENT. - NSCAREPROVIDERS_GEN_ALL_CORE_FT
CARE PROVIDERS:  Accepting Physician: Paulina Goldstein  Administration: Milind Bhakta  Administration: Pia Neville  Admitting: Paulina Goldstein  Attending: Jess Banuelos  Consultant: Lliy Hinojosa  Consultant: Cleveland Parikh  Consultant: Trinidad Vicente  Covering Team: Danie Spencer  ED Attending: Zac Grullon  ED Nurse: Cody Cam  Nurse: Roaslie Smith  Nurse: Rae Owen  Nurse: Marilee Chadwick  Nurse: Tasha Lewis  Override: Rae Owen  Override: Cody Cam  Override: Yoon Dent  Override: Tasha Lewis  PCA/Nursing Assistant: Lady Jayne  Physical Therapy: Justin Aviles  Primary Team: Fermin Up  Primary Team: Paulina Goldstein  Primary Team: Jess Banuelos  Primary Team: Ely Bella  Primary Team: Dena Cai  Primary Team: Abe Nur  Registered Dietitian: Jumana Bashir  Respiratory Therapy: Julienne Mackenzie  : Kendra Song  Team: [facility], Team

## 2024-12-13 NOTE — DISCHARGE NOTE PROVIDER - NSDCMRMEDTOKEN_GEN_ALL_CORE_FT
Crestor 20 mg oral tablet: 1 tab(s) orally once a day (at bedtime)  lisinopril 20 mg oral tablet: 1 tab(s) orally once a day  Norvasc 10 mg oral tablet: 1 tab(s) orally once a day  venlafaxine 150 mg oral capsule, extended release: 1 cap(s) orally once a day   Crestor 20 mg oral tablet: 1 tab(s) orally once a day (at bedtime)  lisinopril 20 mg oral tablet: 1 tab(s) orally once a day  Norvasc 10 mg oral tablet: 1 tab(s) orally once a day  Protonix 40 mg oral delayed release tablet: 1 tab(s) orally 2 times a day  venlafaxine 150 mg oral capsule, extended release: 1 cap(s) orally once a day

## 2024-12-13 NOTE — DISCHARGE NOTE PROVIDER - PROVIDER TOKENS
FREE:[LAST:[haney],FIRST:[nathalia],PHONE:[(   )    -],FAX:[(   )    -],FOLLOWUP:[1 week],ESTABLISHEDPATIENT:[T]],PROVIDER:[[id number]],FOLLOWUP:[1 week],ESTABLISHEDPATIENT:[T]] PROVIDER:[TOKEN:[[id number]],FOLLOWUP:[1 week],ESTABLISHEDPATIENT:[T]],FREE:[LAST:[haney],FIRST:[nathalia],PHONE:[(   )    -],FAX:[(   )    -],FOLLOWUP:[1 week],ESTABLISHEDPATIENT:[T]],PROVIDER:[TOKEN:[[id number]],FOLLOWUP:[1 week]]

## 2024-12-13 NOTE — PHYSICAL THERAPY INITIAL EVALUATION ADULT - PERTINENT HX OF CURRENT PROBLEM, REHAB EVAL
84 year old woman with PMH of iron deficiency, HTN, HLD, and depression, who presents to the ED for a low hemoglobin, admitted for symptomatic anemia. Via lab values on 12/12/24 around 4 pm hemoglobin was 5.3 and hematocrit was 18.3. Patient received 2 units of blood with post-transfusion values of 7.6 and 24.6 respectfully as of 12/13/24 around 8 am.

## 2024-12-13 NOTE — PROVIDER CONTACT NOTE (CHANGE IN STATUS NOTIFICATION) - BACKGROUND
Left patient voicemail regarding scheduling CT scan before seeing Dr Morrow on 11-28-18. Gave patient my direct phone number to contact.      Sharon  [facility] Coordinator    
Pt admitted for anemia and GIB.

## 2024-12-13 NOTE — DISCHARGE NOTE PROVIDER - NSDCCPCAREPLAN_GEN_ALL_CORE_FT
PRINCIPAL DISCHARGE DIAGNOSIS  Diagnosis: UGIB (upper gastrointestinal bleed)  Assessment and Plan of Treatment: You had a gastrointestinal bleed during this admission. You were evaluated by GI and hematology. You were treated with a IV medications to coat/protect the lining of your intestines, and you were also given blood and iron transfusions. You also underwent an endoscopy  Signs of a GI bleed include but are not limited to throwing up dark coffee ground looking material or bright red blood, having your stools appearing dark and tarry in color or bring bright red. If you notice any of these signs, or if you have signs or symptoms of anemia such as being pale, feeling very weak, tired, lightheaded, dizzy, or with a fast heart beat or low blood pressure, please see a Medical professional immediately.  - Please START protonix 40mg twice a day  - Please continue IV venofer infusions with hematology  Please follow up with gastroenterology and hematology within 1 week of discharge.      SECONDARY DISCHARGE DIAGNOSES  Diagnosis: Anemia  Assessment and Plan of Treatment: You have a history of iron deficiency anemia. You were evaluated by hematology during your admission. Your anemia was also worsened by your GI bleed. You had iron studies showing you have a deficiency, and you were given IV iron. You were also given units of blood. Your hemoglobin levels were monitored throughout admission, and improved.  - Please START protonix 40mg twice a day  - Please continue IV venofer infusions with hematology  Please follow up with gastroenterology and hematology within 1 week of discharge.       PRINCIPAL DISCHARGE DIAGNOSIS  Diagnosis: UGIB (upper gastrointestinal bleed)  Assessment and Plan of Treatment: You had a gastrointestinal bleed during this admission. You were evaluated by GI and hematology. You were treated with a IV medications to coat/protect the lining of your intestines, and you were also given blood and iron transfusions. You also underwent an endoscopy  Signs of a GI bleed include but are not limited to throwing up dark coffee ground looking material or bright red blood, having your stools appearing dark and tarry in color or bring bright red. If you notice any of these signs, or if you have signs or symptoms of anemia such as being pale, feeling very weak, tired, lightheaded, dizzy, or with a fast heart beat or low blood pressure, please see a Medical professional immediately.  - Please START protonix 40mg twice a day  - Please continue IV venofer infusions with hematology  Please follow up with gastroenterology and hematology within 1 week of discharge. please follow up with gastroenterology for results of biopsy.      SECONDARY DISCHARGE DIAGNOSES  Diagnosis: Anemia  Assessment and Plan of Treatment: You have a history of iron deficiency anemia. You were evaluated by hematology during your admission. Your anemia was also worsened by your GI bleed. You had iron studies showing you have a deficiency, and you were given IV iron. You were also given units of blood. Your hemoglobin levels were monitored throughout admission, and improved.  - Please START protonix 40mg twice a day  - Please continue IV venofer infusions with hematology  Please follow up with gastroenterology and hematology within 1 week of discharge.

## 2024-12-13 NOTE — CONSULT NOTE ADULT - ASSESSMENT
Anemia  FOBT+  Iron deficiency    CBC noted  Hgb improving s/p transfusions  PPI BID  Monitor for any overt GIB symptoms  Last colonoscopy 9 years ago wnl  To discuss possible inpatient endoscopic eval with attending    I reviewed the overnight course of events on the unit, re-confirming the patient history. I discussed the care with the patient  Differential diagnosis and plan of care discussed with patient after the evaluation  35 minutes spent on total encounter of which more than fifty percent of the encounter was spent counseling and/or coordinating care by the attending physician.
85 yo woman follows in our office since 10/2023 for iron deficiency anemia good response to IV iron(Ferahme), last dose 9/2024 adn CBC 10/2024r Hgb 11.6 mcv 93.8, but when seen in office 12/12, hgb 5.2 wbc 11.14 plts 417 mcv 79.7 and was sent to ER  Pt reports intermittent black stool but attributed to her food intake of spinacha dn tomatoes, has been instructed to see GI but has not  Admits to 3wks incr SOB, SHEEAHN, fatigue  had episode 6/2024 w black stool as wwell as blood in stool  No abdomen pain  CBC in ER 12/12 wbc 10.44 hgb 5.3 hct 18.3 mcv 78.9 plts 397 rbc 2.32  Transfused 2U PRBC, feeling improved  Stool occult blood positive  12/13 hgb 8.1    -anemia, precipitous drop in Hct due to GI blood loss, iron deficiency  -appropriate response to PRBC  -GI consulted  -check iron studies, B12 folate, and will replete w any deficiencies  -follow serial CBC, transfuse as clinically indicated    discussed w pt and GI  thank you, will follow w you  -

## 2024-12-13 NOTE — CARE COORDINATION ASSESSMENT. - LIVES WITH, PROFILE
----- Message from Marshal Joyner Florencia sent at 10/28/2022 10:17 AM EDT -----  Regarding: Meds and   Appointment   Good morning this is Aliza Noun I'm sorry for bothering you but I need to know some answers I don't understand why he doesn't want to help me control this problem I have but with that being said where do I go from here am I to see him again or am I finished all I need to know is a answer I know this message has been checked yesterday and today and no one has let me know what's going on all I'm asking is a common courtesy for a phone call or even a text message on here I'm good with that have a good day alone

## 2024-12-13 NOTE — CARE COORDINATION ASSESSMENT. - OTHER PERTINENT REFERRAL INFORMATION
85 y/o female admitted from home with low hemoglobin. Pt is a&ox4, indep with ADLS, lives in senior housing complex. Pt has h/o depression r/t loss of daughter in 2019, reports seeing therapist weekly in community. Supportive counseling provided and SW will remain available for follow up as needed. No anticipated DC needs at this time. PCP- [facility] needed.

## 2024-12-14 LAB
ALBUMIN SERPL ELPH-MCNC: 3.8 G/DL — SIGNIFICANT CHANGE UP (ref 3.3–5)
ALP SERPL-CCNC: 66 U/L — SIGNIFICANT CHANGE UP (ref 40–120)
ALT FLD-CCNC: 18 U/L — SIGNIFICANT CHANGE UP (ref 12–78)
ANION GAP SERPL CALC-SCNC: 8 MMOL/L — SIGNIFICANT CHANGE UP (ref 5–17)
AST SERPL-CCNC: 11 U/L — LOW (ref 15–37)
BASOPHILS # BLD AUTO: 0.07 K/UL — SIGNIFICANT CHANGE UP (ref 0–0.2)
BASOPHILS NFR BLD AUTO: 0.5 % — SIGNIFICANT CHANGE UP (ref 0–2)
BILIRUB SERPL-MCNC: 0.2 MG/DL — SIGNIFICANT CHANGE UP (ref 0.2–1.2)
BUN SERPL-MCNC: 16 MG/DL — SIGNIFICANT CHANGE UP (ref 7–23)
CALCIUM SERPL-MCNC: 9.7 MG/DL — SIGNIFICANT CHANGE UP (ref 8.5–10.1)
CHLORIDE SERPL-SCNC: 109 MMOL/L — HIGH (ref 96–108)
CO2 SERPL-SCNC: 25 MMOL/L — SIGNIFICANT CHANGE UP (ref 22–31)
CREAT SERPL-MCNC: 0.9 MG/DL — SIGNIFICANT CHANGE UP (ref 0.5–1.3)
EGFR: 64 ML/MIN/1.73M2 — SIGNIFICANT CHANGE UP
EOSINOPHIL # BLD AUTO: 0.54 K/UL — HIGH (ref 0–0.5)
EOSINOPHIL NFR BLD AUTO: 3.9 % — SIGNIFICANT CHANGE UP (ref 0–6)
GLUCOSE SERPL-MCNC: 102 MG/DL — HIGH (ref 70–99)
HCT VFR BLD CALC: 28.5 % — LOW (ref 34.5–45)
HGB BLD-MCNC: 8.9 G/DL — LOW (ref 11.5–15.5)
IMM GRANULOCYTES NFR BLD AUTO: 0.6 % — SIGNIFICANT CHANGE UP (ref 0–0.9)
LYMPHOCYTES # BLD AUTO: 18.7 % — SIGNIFICANT CHANGE UP (ref 13–44)
LYMPHOCYTES # BLD AUTO: 2.6 K/UL — SIGNIFICANT CHANGE UP (ref 1–3.3)
MCHC RBC-ENTMCNC: 25.2 PG — LOW (ref 27–34)
MCHC RBC-ENTMCNC: 31.2 G/DL — LOW (ref 32–36)
MCV RBC AUTO: 80.7 FL — SIGNIFICANT CHANGE UP (ref 80–100)
MONOCYTES # BLD AUTO: 1.07 K/UL — HIGH (ref 0–0.9)
MONOCYTES NFR BLD AUTO: 7.7 % — SIGNIFICANT CHANGE UP (ref 2–14)
NEUTROPHILS # BLD AUTO: 9.52 K/UL — HIGH (ref 1.8–7.4)
NEUTROPHILS NFR BLD AUTO: 68.6 % — SIGNIFICANT CHANGE UP (ref 43–77)
NRBC # BLD: 0 /100 WBCS — SIGNIFICANT CHANGE UP (ref 0–0)
PLATELET # BLD AUTO: 429 K/UL — HIGH (ref 150–400)
POTASSIUM SERPL-MCNC: 3.7 MMOL/L — SIGNIFICANT CHANGE UP (ref 3.5–5.3)
POTASSIUM SERPL-SCNC: 3.7 MMOL/L — SIGNIFICANT CHANGE UP (ref 3.5–5.3)
PROT SERPL-MCNC: 7.9 G/DL — SIGNIFICANT CHANGE UP (ref 6–8.3)
RBC # BLD: 3.53 M/UL — LOW (ref 3.8–5.2)
RBC # FLD: 17.1 % — HIGH (ref 10.3–14.5)
SODIUM SERPL-SCNC: 142 MMOL/L — SIGNIFICANT CHANGE UP (ref 135–145)
WBC # BLD: 13.89 K/UL — HIGH (ref 3.8–10.5)
WBC # FLD AUTO: 13.89 K/UL — HIGH (ref 3.8–10.5)

## 2024-12-14 PROCEDURE — 99233 SBSQ HOSP IP/OBS HIGH 50: CPT

## 2024-12-14 RX ORDER — IRON SUCROSE 20 MG/ML
100 INJECTION, SOLUTION INTRAVENOUS EVERY 24 HOURS
Refills: 0 | Status: DISCONTINUED | OUTPATIENT
Start: 2024-12-14 | End: 2024-12-16

## 2024-12-14 RX ADMIN — PANTOPRAZOLE SODIUM 40 MILLIGRAM(S): 40 TABLET, DELAYED RELEASE ORAL at 17:34

## 2024-12-14 RX ADMIN — PANTOPRAZOLE SODIUM 40 MILLIGRAM(S): 40 TABLET, DELAYED RELEASE ORAL at 06:26

## 2024-12-14 RX ADMIN — LISINOPRIL 20 MILLIGRAM(S): 20 TABLET ORAL at 06:25

## 2024-12-14 RX ADMIN — Medication 1000 UNIT(S): at 11:16

## 2024-12-14 RX ADMIN — ROSUVASTATIN CALCIUM 20 MILLIGRAM(S): 5 TABLET, FILM COATED ORAL at 23:01

## 2024-12-14 RX ADMIN — VENLAFAXINE HYDROCHLORIDE 150 MILLIGRAM(S): 75 CAPSULE, EXTENDED RELEASE ORAL at 11:16

## 2024-12-14 RX ADMIN — IRON SUCROSE 210 MILLIGRAM(S): 20 INJECTION, SOLUTION INTRAVENOUS at 23:03

## 2024-12-14 RX ADMIN — AMLODIPINE BESYLATE 10 MILLIGRAM(S): 10 TABLET ORAL at 06:25

## 2024-12-14 NOTE — PROGRESS NOTE ADULT - PROBLEM SELECTOR PLAN 1
pt w/ SHEEHAN and fatigue, hx of iron deficiency on iron infusions   - Hgb 5.3 FOBT + on admission; s/p protonix 40 IV x 1 and 2U pRBCs in ED  - Hgb improved appropriately to 7.6 this AM  - FU iron panel  - monitor daily CBC  - maintain active type+screen   - c/w Protonix 40 mg IV bid  - clear liquid diet  - GI consulted, Dr. Lawson, f/u recs, possible endoscopy  - Heme consulted, Dr. Parikh, f/u recs pt w/ SHEEHAN and fatigue, hx of iron deficiency on iron infusions   - Hgb 5.3 FOBT + on admission; s/p protonix 40 IV x 1 and 2U pRBCs in ED  - Hgb improved appropriately to 7.6 this AM  - Total iron 21, iron deficiency   - monitor daily CBC, slight rise in wbc, monitor wbc  - maintain active type+screen   - c/w Protonix 40 mg IV bid  - clear liquid diet  - GI consulted, Dr. Lawson, f/u recs, plan for endoscopy Monday  - Heme consulted, Dr. Parikh, f/u recs

## 2024-12-15 LAB
ALBUMIN SERPL ELPH-MCNC: 3.5 G/DL — SIGNIFICANT CHANGE UP (ref 3.3–5)
ALP SERPL-CCNC: 56 U/L — SIGNIFICANT CHANGE UP (ref 40–120)
ALT FLD-CCNC: 16 U/L — SIGNIFICANT CHANGE UP (ref 12–78)
ANION GAP SERPL CALC-SCNC: 6 MMOL/L — SIGNIFICANT CHANGE UP (ref 5–17)
AST SERPL-CCNC: 11 U/L — LOW (ref 15–37)
BASOPHILS # BLD AUTO: 0.06 K/UL — SIGNIFICANT CHANGE UP (ref 0–0.2)
BASOPHILS NFR BLD AUTO: 0.6 % — SIGNIFICANT CHANGE UP (ref 0–2)
BILIRUB SERPL-MCNC: 0.2 MG/DL — SIGNIFICANT CHANGE UP (ref 0.2–1.2)
BUN SERPL-MCNC: 19 MG/DL — SIGNIFICANT CHANGE UP (ref 7–23)
CALCIUM SERPL-MCNC: 9.2 MG/DL — SIGNIFICANT CHANGE UP (ref 8.5–10.1)
CHLORIDE SERPL-SCNC: 109 MMOL/L — HIGH (ref 96–108)
CO2 SERPL-SCNC: 26 MMOL/L — SIGNIFICANT CHANGE UP (ref 22–31)
CREAT SERPL-MCNC: 0.79 MG/DL — SIGNIFICANT CHANGE UP (ref 0.5–1.3)
EGFR: 75 ML/MIN/1.73M2 — SIGNIFICANT CHANGE UP
EOSINOPHIL # BLD AUTO: 0.49 K/UL — SIGNIFICANT CHANGE UP (ref 0–0.5)
EOSINOPHIL NFR BLD AUTO: 4.9 % — SIGNIFICANT CHANGE UP (ref 0–6)
GLUCOSE SERPL-MCNC: 92 MG/DL — SIGNIFICANT CHANGE UP (ref 70–99)
HCT VFR BLD CALC: 25 % — LOW (ref 34.5–45)
HGB BLD-MCNC: 7.8 G/DL — LOW (ref 11.5–15.5)
IMM GRANULOCYTES NFR BLD AUTO: 0.6 % — SIGNIFICANT CHANGE UP (ref 0–0.9)
LYMPHOCYTES # BLD AUTO: 1.54 K/UL — SIGNIFICANT CHANGE UP (ref 1–3.3)
LYMPHOCYTES # BLD AUTO: 15.2 % — SIGNIFICANT CHANGE UP (ref 13–44)
MCHC RBC-ENTMCNC: 25.2 PG — LOW (ref 27–34)
MCHC RBC-ENTMCNC: 31.2 G/DL — LOW (ref 32–36)
MCV RBC AUTO: 80.6 FL — SIGNIFICANT CHANGE UP (ref 80–100)
MONOCYTES # BLD AUTO: 0.95 K/UL — HIGH (ref 0–0.9)
MONOCYTES NFR BLD AUTO: 9.4 % — SIGNIFICANT CHANGE UP (ref 2–14)
NEUTROPHILS # BLD AUTO: 7 K/UL — SIGNIFICANT CHANGE UP (ref 1.8–7.4)
NEUTROPHILS NFR BLD AUTO: 69.3 % — SIGNIFICANT CHANGE UP (ref 43–77)
NRBC # BLD: 0 /100 WBCS — SIGNIFICANT CHANGE UP (ref 0–0)
PLATELET # BLD AUTO: 354 K/UL — SIGNIFICANT CHANGE UP (ref 150–400)
POTASSIUM SERPL-MCNC: 3.9 MMOL/L — SIGNIFICANT CHANGE UP (ref 3.5–5.3)
POTASSIUM SERPL-SCNC: 3.9 MMOL/L — SIGNIFICANT CHANGE UP (ref 3.5–5.3)
PROT SERPL-MCNC: 7 G/DL — SIGNIFICANT CHANGE UP (ref 6–8.3)
RBC # BLD: 3.1 M/UL — LOW (ref 3.8–5.2)
RBC # FLD: 17.4 % — HIGH (ref 10.3–14.5)
SODIUM SERPL-SCNC: 141 MMOL/L — SIGNIFICANT CHANGE UP (ref 135–145)
WBC # BLD: 10.1 K/UL — SIGNIFICANT CHANGE UP (ref 3.8–10.5)
WBC # FLD AUTO: 10.1 K/UL — SIGNIFICANT CHANGE UP (ref 3.8–10.5)

## 2024-12-15 PROCEDURE — 99233 SBSQ HOSP IP/OBS HIGH 50: CPT

## 2024-12-15 RX ADMIN — IRON SUCROSE 210 MILLIGRAM(S): 20 INJECTION, SOLUTION INTRAVENOUS at 22:30

## 2024-12-15 RX ADMIN — LISINOPRIL 20 MILLIGRAM(S): 20 TABLET ORAL at 06:44

## 2024-12-15 RX ADMIN — Medication 1000 UNIT(S): at 11:30

## 2024-12-15 RX ADMIN — ROSUVASTATIN CALCIUM 20 MILLIGRAM(S): 5 TABLET, FILM COATED ORAL at 22:30

## 2024-12-15 RX ADMIN — PANTOPRAZOLE SODIUM 40 MILLIGRAM(S): 40 TABLET, DELAYED RELEASE ORAL at 06:54

## 2024-12-15 RX ADMIN — PANTOPRAZOLE SODIUM 40 MILLIGRAM(S): 40 TABLET, DELAYED RELEASE ORAL at 17:29

## 2024-12-15 RX ADMIN — VENLAFAXINE HYDROCHLORIDE 150 MILLIGRAM(S): 75 CAPSULE, EXTENDED RELEASE ORAL at 11:30

## 2024-12-15 RX ADMIN — AMLODIPINE BESYLATE 10 MILLIGRAM(S): 10 TABLET ORAL at 06:44

## 2024-12-15 NOTE — PROGRESS NOTE ADULT - PROBLEM SELECTOR PLAN 1
pt w/ SHEEHAN and fatigue, hx of iron deficiency on iron infusions   - Hgb 5.3 FOBT + on admission; s/p protonix 40 IV x 1 and 2U pRBCs in ED  - Hgb improved appropriately to 7.6 this AM  - Total iron 21, iron deficiency   - started on IV iron  - maintain active type+screen   - c/w Protonix 40 mg IV bid  - advanced to regular diet  - GI consulted, Dr. Lawson, f/u recs, plan for endoscopy Monday, will be NPO after midnight tonight  - Heme consulted, Dr. Parikh, f/u recs

## 2024-12-15 NOTE — PROGRESS NOTE ADULT - ATTENDING COMMENTS
Patient seen at bedside  Reports feeling better.   s/p prbc transfusions  No further bloody bowel movement.     Acute on chronic anemia   r/o GIB    - transfuse if needed for hgb <7     - hematology and GI following     - diet as tolerated     - protonix 40mg BID     - plan for EGD on Monday. NPO after midnight     - IV iron     Depression     - cont venlafaxine 150mg daily     HLD    - cont crestor     DVT proph: SCDs, no chemical AC given suspected bleeding.   NPO for EGD in AM.
Patient seen at bedside  Reports feeling better.   s/p prbc transfusions  + bloody BM     Acute on chronic anemia   r/o GIB    - transfuse if needed for hgb <7     - hematology and GI following     - advance to regular diet     - protonix 40mg BID     - plan for EGD on Monday     - monitor hgb    Depression     - cont venlafaxine 150mg daily     HLD    - cont crestor     DVT proph: SCDs, no chemical AC given suspected bleeding.
Patient seen at bedside  Reports feeling better.   s/p prbc transfusions  No further bloody bowel movement.     Acute on chronic anemia   r/o GIB    - transfuse if needed for hgb <7     - hematology and GI following     - diet as tolerated     - protonix 40mg BID     - plan for EGD on Monday     - monitor hgb    Depression     - cont venlafaxine 150mg daily     HLD    - cont crestor     DVT proph: SCDs, no chemical AC given suspected bleeding.

## 2024-12-16 ENCOUNTER — TRANSCRIPTION ENCOUNTER (OUTPATIENT)
Age: 84
End: 2024-12-16

## 2024-12-16 VITALS
TEMPERATURE: 99 F | SYSTOLIC BLOOD PRESSURE: 129 MMHG | RESPIRATION RATE: 16 BRPM | OXYGEN SATURATION: 93 % | HEART RATE: 68 BPM | DIASTOLIC BLOOD PRESSURE: 78 MMHG

## 2024-12-16 LAB
ANION GAP SERPL CALC-SCNC: 5 MMOL/L — SIGNIFICANT CHANGE UP (ref 5–17)
BUN SERPL-MCNC: 20 MG/DL — SIGNIFICANT CHANGE UP (ref 7–23)
CALCIUM SERPL-MCNC: 9.7 MG/DL — SIGNIFICANT CHANGE UP (ref 8.5–10.1)
CHLORIDE SERPL-SCNC: 111 MMOL/L — HIGH (ref 96–108)
CO2 SERPL-SCNC: 26 MMOL/L — SIGNIFICANT CHANGE UP (ref 22–31)
CREAT SERPL-MCNC: 0.86 MG/DL — SIGNIFICANT CHANGE UP (ref 0.5–1.3)
EGFR: 67 ML/MIN/1.73M2 — SIGNIFICANT CHANGE UP
GLUCOSE SERPL-MCNC: 98 MG/DL — SIGNIFICANT CHANGE UP (ref 70–99)
HCT VFR BLD CALC: 26.4 % — LOW (ref 34.5–45)
HGB BLD-MCNC: 8 G/DL — LOW (ref 11.5–15.5)
MCHC RBC-ENTMCNC: 24.6 PG — LOW (ref 27–34)
MCHC RBC-ENTMCNC: 30.3 G/DL — LOW (ref 32–36)
MCV RBC AUTO: 81.2 FL — SIGNIFICANT CHANGE UP (ref 80–100)
NRBC # BLD: 0 /100 WBCS — SIGNIFICANT CHANGE UP (ref 0–0)
PLATELET # BLD AUTO: 306 K/UL — SIGNIFICANT CHANGE UP (ref 150–400)
POTASSIUM SERPL-MCNC: 4.7 MMOL/L — SIGNIFICANT CHANGE UP (ref 3.5–5.3)
POTASSIUM SERPL-SCNC: 4.7 MMOL/L — SIGNIFICANT CHANGE UP (ref 3.5–5.3)
RBC # BLD: 3.25 M/UL — LOW (ref 3.8–5.2)
RBC # FLD: 17.6 % — HIGH (ref 10.3–14.5)
SODIUM SERPL-SCNC: 142 MMOL/L — SIGNIFICANT CHANGE UP (ref 135–145)
WBC # BLD: 11.62 K/UL — HIGH (ref 3.8–10.5)
WBC # FLD AUTO: 11.62 K/UL — HIGH (ref 3.8–10.5)

## 2024-12-16 PROCEDURE — 92610 EVALUATE SWALLOWING FUNCTION: CPT

## 2024-12-16 PROCEDURE — 71045 X-RAY EXAM CHEST 1 VIEW: CPT

## 2024-12-16 PROCEDURE — 80053 COMPREHEN METABOLIC PANEL: CPT

## 2024-12-16 PROCEDURE — 83735 ASSAY OF MAGNESIUM: CPT

## 2024-12-16 PROCEDURE — 36415 COLL VENOUS BLD VENIPUNCTURE: CPT

## 2024-12-16 PROCEDURE — 86900 BLOOD TYPING SEROLOGIC ABO: CPT

## 2024-12-16 PROCEDURE — 85610 PROTHROMBIN TIME: CPT

## 2024-12-16 PROCEDURE — 82272 OCCULT BLD FECES 1-3 TESTS: CPT

## 2024-12-16 PROCEDURE — 93005 ELECTROCARDIOGRAM TRACING: CPT

## 2024-12-16 PROCEDURE — 96374 THER/PROPH/DIAG INJ IV PUSH: CPT

## 2024-12-16 PROCEDURE — 88341 IMHCHEM/IMCYTCHM EA ADD ANTB: CPT

## 2024-12-16 PROCEDURE — 85014 HEMATOCRIT: CPT

## 2024-12-16 PROCEDURE — 99285 EMERGENCY DEPT VISIT HI MDM: CPT | Mod: 25

## 2024-12-16 PROCEDURE — 88342 IMHCHEM/IMCYTCHM 1ST ANTB: CPT

## 2024-12-16 PROCEDURE — P9016: CPT

## 2024-12-16 PROCEDURE — 85018 HEMOGLOBIN: CPT

## 2024-12-16 PROCEDURE — 88312 SPECIAL STAINS GROUP 1: CPT

## 2024-12-16 PROCEDURE — 88305 TISSUE EXAM BY PATHOLOGIST: CPT | Mod: 26

## 2024-12-16 PROCEDURE — 88312 SPECIAL STAINS GROUP 1: CPT | Mod: 26

## 2024-12-16 PROCEDURE — 85730 THROMBOPLASTIN TIME PARTIAL: CPT

## 2024-12-16 PROCEDURE — 83540 ASSAY OF IRON: CPT

## 2024-12-16 PROCEDURE — 88341 IMHCHEM/IMCYTCHM EA ADD ANTB: CPT | Mod: 26

## 2024-12-16 PROCEDURE — 85025 COMPLETE CBC W/AUTO DIFF WBC: CPT

## 2024-12-16 PROCEDURE — 80061 LIPID PANEL: CPT

## 2024-12-16 PROCEDURE — 88313 SPECIAL STAINS GROUP 2: CPT | Mod: 26

## 2024-12-16 PROCEDURE — 84466 ASSAY OF TRANSFERRIN: CPT

## 2024-12-16 PROCEDURE — 82746 ASSAY OF FOLIC ACID SERUM: CPT

## 2024-12-16 PROCEDURE — 86850 RBC ANTIBODY SCREEN: CPT

## 2024-12-16 PROCEDURE — 86923 COMPATIBILITY TEST ELECTRIC: CPT

## 2024-12-16 PROCEDURE — 80048 BASIC METABOLIC PNL TOTAL CA: CPT

## 2024-12-16 PROCEDURE — 97162 PT EVAL MOD COMPLEX 30 MIN: CPT

## 2024-12-16 PROCEDURE — 86901 BLOOD TYPING SEROLOGIC RH(D): CPT

## 2024-12-16 PROCEDURE — 83550 IRON BINDING TEST: CPT

## 2024-12-16 PROCEDURE — 82728 ASSAY OF FERRITIN: CPT

## 2024-12-16 PROCEDURE — 85027 COMPLETE CBC AUTOMATED: CPT

## 2024-12-16 PROCEDURE — 99239 HOSP IP/OBS DSCHRG MGMT >30: CPT

## 2024-12-16 PROCEDURE — 88305 TISSUE EXAM BY PATHOLOGIST: CPT

## 2024-12-16 PROCEDURE — 88313 SPECIAL STAINS GROUP 2: CPT

## 2024-12-16 PROCEDURE — 82607 VITAMIN B-12: CPT

## 2024-12-16 PROCEDURE — 88342 IMHCHEM/IMCYTCHM 1ST ANTB: CPT | Mod: 26

## 2024-12-16 PROCEDURE — 36430 TRANSFUSION BLD/BLD COMPNT: CPT

## 2024-12-16 DEVICE — ESOPHAGEAL BALLOON CATH CRE FIXED WIRE 15-16.5-18MM: Type: IMPLANTABLE DEVICE | Status: FUNCTIONAL

## 2024-12-16 DEVICE — ESOPHAGEAL BALLOON CATH CRE FIXED WIRE 10-11-12MM: Type: IMPLANTABLE DEVICE | Status: FUNCTIONAL

## 2024-12-16 DEVICE — ESOPHAGEAL BALLOON CATH CRE FIXED WIRE 6-7-8MM: Type: IMPLANTABLE DEVICE | Status: FUNCTIONAL

## 2024-12-16 DEVICE — HEMOSPRAY HEMOSTAT ENDO 7F: Type: IMPLANTABLE DEVICE | Status: FUNCTIONAL

## 2024-12-16 DEVICE — ESOPHAGEAL BALLOON CATH CRE FIXED WIRE 8-9-10MM: Type: IMPLANTABLE DEVICE | Status: FUNCTIONAL

## 2024-12-16 DEVICE — ESOPHAGEAL BALLOON CATH CRE FIXED WIRE 12-13.5-15MM: Type: IMPLANTABLE DEVICE | Status: FUNCTIONAL

## 2024-12-16 RX ORDER — PANTOPRAZOLE SODIUM 40 MG/1
1 TABLET, DELAYED RELEASE ORAL
Qty: 60 | Refills: 0
Start: 2024-12-16 | End: 2025-01-14

## 2024-12-16 RX ADMIN — Medication 1000 UNIT(S): at 13:09

## 2024-12-16 RX ADMIN — AMLODIPINE BESYLATE 10 MILLIGRAM(S): 10 TABLET ORAL at 06:23

## 2024-12-16 RX ADMIN — VENLAFAXINE HYDROCHLORIDE 150 MILLIGRAM(S): 75 CAPSULE, EXTENDED RELEASE ORAL at 13:09

## 2024-12-16 RX ADMIN — PANTOPRAZOLE SODIUM 40 MILLIGRAM(S): 40 TABLET, DELAYED RELEASE ORAL at 06:23

## 2024-12-16 RX ADMIN — LISINOPRIL 20 MILLIGRAM(S): 20 TABLET ORAL at 06:23

## 2024-12-16 NOTE — PROGRESS NOTE ADULT - PROBLEM SELECTOR PLAN 4
chronic   - c/w home venlafaxine 150 mg qd

## 2024-12-16 NOTE — PROGRESS NOTE ADULT - SUBJECTIVE AND OBJECTIVE BOX
[facility]  Cyrus Stewart PA-C  [address] [place] [address]  [phone]      INTERVAL HPI/OVERNIGHT EVENTS:  Pt s/e  Tolerating diet  No GI complaints    MEDICATIONS  (STANDING):  amLODIPine   Tablet 10 milliGRAM(s) Oral daily  cholecalciferol 1000 Unit(s) Oral daily  influenza  Vaccine (HIGH DOSE) 0.5 milliLiter(s) IntraMuscular once  iron sucrose IVPB 100 milliGRAM(s) IV Intermittent every 24 hours  lisinopril 20 milliGRAM(s) Oral daily  pantoprazole  Injectable 40 milliGRAM(s) IV Push two times a day  rosuvastatin 20 milliGRAM(s) Oral at bedtime  venlafaxine XR. 150 milliGRAM(s) Oral daily    MEDICATIONS  (PRN):  acetaminophen     Tablet .. 650 milliGRAM(s) Oral every 6 hours PRN Temp greater or equal to 38C (100.4F), Mild Pain (1 - 3)  aluminum hydroxide/magnesium hydroxide/simethicone Suspension 30 milliLiter(s) Oral every 4 hours PRN Dyspepsia  melatonin 3 milliGRAM(s) Oral at bedtime PRN Insomnia  ondansetron Injectable 4 milliGRAM(s) IV Push every 8 hours PRN Nausea and/or Vomiting      Allergies    penicillin (Hives)  clindamycin (Hives)  sulfa drugs (Hives)        PHYSICAL EXAM:   Vital Signs:  Vital Signs Last 24 Hrs  T(C): 36.6 (15 Dec 2024 04:09), Max: 36.8 (14 Dec 2024 20:21)  T(F): 97.9 (15 Dec 2024 04:09), Max: 98.2 (14 Dec 2024 20:21)  HR: 77 (15 Dec 2024 04:09) (75 - 77)  BP: 145/65 (15 Dec 2024 04:09) (129/56 - 154/62)  BP(mean): --  RR: 18 (15 Dec 2024 04:09) (18 - 18)  SpO2: 95% (15 Dec 2024 04:09) (93% - 95%)    Parameters below as of 15 Dec 2024 04:09  Patient On (Oxygen Delivery Method): room air      Daily     Daily Weight in k.8 (15 Dec 2024 04:09)    GENERAL:  Appears stated age  HEENT:  NC/AT  CHEST:  Full & symmetric excursion  HEART:  Regular rhythm  ABDOMEN:  Soft, non-tender, non-distended  EXTEREMITIES:  no cyanosis  SKIN:  No rash  NEURO:  Alert      LABS:                        8.9    13.89 )-----------( 429      ( 14 Dec 2024 10:19 )             28.5     12-14    142  |  109[H]  |  16  ----------------------------<  102[H]  3.7   |  25  |  0.90    Ca    9.7      14 Dec 2024 10:19    TPro  7.9  /  Alb  3.8  /  TBili  0.2  /  DBili  x   /  AST  11[L]  /  ALT  18  /  AlkPhos  66  12-14      Urinalysis Basic - ( 14 Dec 2024 10:19 )    Color: x / Appearance: x / SG: x / pH: x  Gluc: 102 mg/dL / Ketone: x  / Bili: x / Urobili: x   Blood: x / Protein: x / Nitrite: x   Leuk Esterase: x / RBC: x / WBC x   Sq Epi: x / Non Sq Epi: x / Bacteria: x  
[facility]  Cyrus Stewart PA-C  [address] [place] [address]  [phone]      INTERVAL HPI/OVERNIGHT EVENTS:  Pt s/e  Pt denies abdominal pain or any further GI complaints    MEDICATIONS  (STANDING):  amLODIPine   Tablet 10 milliGRAM(s) Oral daily  cholecalciferol 1000 Unit(s) Oral daily  influenza  Vaccine (HIGH DOSE) 0.5 milliLiter(s) IntraMuscular once  lisinopril 20 milliGRAM(s) Oral daily  pantoprazole  Injectable 40 milliGRAM(s) IV Push two times a day  rosuvastatin 20 milliGRAM(s) Oral at bedtime  venlafaxine XR. 150 milliGRAM(s) Oral daily    MEDICATIONS  (PRN):  acetaminophen     Tablet .. 650 milliGRAM(s) Oral every 6 hours PRN Temp greater or equal to 38C (100.4F), Mild Pain (1 - 3)  aluminum hydroxide/magnesium hydroxide/simethicone Suspension 30 milliLiter(s) Oral every 4 hours PRN Dyspepsia  melatonin 3 milliGRAM(s) Oral at bedtime PRN Insomnia  ondansetron Injectable 4 milliGRAM(s) IV Push every 8 hours PRN Nausea and/or Vomiting      Allergies    penicillin (Hives)  clindamycin (Hives)  sulfa drugs (Hives)      PHYSICAL EXAM:   Vital Signs:  Vital Signs Last 24 Hrs  T(C): 36.5 (14 Dec 2024 04:57), Max: 36.8 (13 Dec 2024 11:43)  T(F): 97.7 (14 Dec 2024 04:57), Max: 98.3 (13 Dec 2024 11:43)  HR: 72 (14 Dec 2024 06:00) (63 - 79)  BP: 158/72 (14 Dec 2024 06:00) (125/74 - 164/56)  BP(mean): --  RR: 18 (14 Dec 2024 06:00) (18 - 20)  SpO2: 94% (14 Dec 2024 06:00) (94% - 98%)    Parameters below as of 14 Dec 2024 06:00  Patient On (Oxygen Delivery Method): room air      Daily Height in cm: 160.02 (13 Dec 2024 11:38)    Daily Weight in k.9 (14 Dec 2024 04:57)    GENERAL:  Appears stated age  HEENT:  NC/AT  CHEST:  Full & symmetric excursion  HEART:  Regular rhythm  ABDOMEN:  Soft, non-tender, non-distended  EXTEREMITIES:  no cyanosis  SKIN:  No rash  NEURO:  Alert      LABS:                        8.3    x     )-----------( x        ( 13 Dec 2024 20:20 )             26.5         143  |  112[H]  |  15  ----------------------------<  100[H]  5.0   |  29  |  0.97    Ca    9.7      13 Dec 2024 07:40  Mg     2.1         TPro  7.0  /  Alb  3.5  /  TBili  0.6  /  DBili  x   /  AST  20  /  ALT  17  /  AlkPhos  61      PT/INR - ( 12 Dec 2024 16:52 )   PT: 13.6 sec;   INR: 1.15 ratio         PTT - ( 12 Dec 2024 16:52 )  PTT:29.9 sec  Urinalysis Basic - ( 13 Dec 2024 07:40 )    Color: x / Appearance: x / SG: x / pH: x  Gluc: 100 mg/dL / Ketone: x  / Bili: x / Urobili: x   Blood: x / Protein: x / Nitrite: x   Leuk Esterase: x / RBC: x / WBC x   Sq Epi: x / Non Sq Epi: x / Bacteria: x  
Patient is a 84y old  Female who presents with a chief complaint of anemia (13 Dec 2024 16:17)      INTERVAL HPI/OVERNIGHT EVENTS: No acute overnight events. Pt was seen and examined at bedside. Patient endorses imporvement in energy levels, endorses poor sleep but eventually able to fall asleep. Denies abd and cp, no sob.     MEDICATIONS  (STANDING):  amLODIPine   Tablet 10 milliGRAM(s) Oral daily  cholecalciferol 1000 Unit(s) Oral daily  influenza  Vaccine (HIGH DOSE) 0.5 milliLiter(s) IntraMuscular once  lisinopril 20 milliGRAM(s) Oral daily  pantoprazole  Injectable 40 milliGRAM(s) IV Push two times a day  rosuvastatin 20 milliGRAM(s) Oral at bedtime  venlafaxine XR. 150 milliGRAM(s) Oral daily    MEDICATIONS  (PRN):  acetaminophen     Tablet .. 650 milliGRAM(s) Oral every 6 hours PRN Temp greater or equal to 38C (100.4F), Mild Pain (1 - 3)  aluminum hydroxide/magnesium hydroxide/simethicone Suspension 30 milliLiter(s) Oral every 4 hours PRN Dyspepsia  melatonin 3 milliGRAM(s) Oral at bedtime PRN Insomnia  ondansetron Injectable 4 milliGRAM(s) IV Push every 8 hours PRN Nausea and/or Vomiting      Allergies    penicillin (Hives)  clindamycin (Hives)  sulfa drugs (Hives)    Intolerances        REVIEW OF SYSTEMS:  CONSTITUTIONAL: No fever or chills  HEENT:  No headache, no sore throat  RESPIRATORY: No cough, wheezing, or shortness of breath  CARDIOVASCULAR: No chest pain, palpitations  GASTROINTESTINAL: No abd pain, nausea, vomiting, or diarrhea  GENITOURINARY: No dysuria, frequency, or hematuria  NEUROLOGICAL: no focal weakness or dizziness  MUSCULOSKELETAL: no myalgias     Vital Signs Last 24 Hrs  T(C): 36.5 (14 Dec 2024 04:57), Max: 36.8 (13 Dec 2024 11:43)  T(F): 97.7 (14 Dec 2024 04:57), Max: 98.3 (13 Dec 2024 11:43)  HR: 72 (14 Dec 2024 06:00) (63 - 79)  BP: 158/72 (14 Dec 2024 06:00) (125/74 - 164/56)  BP(mean): --  RR: 18 (14 Dec 2024 06:00) (18 - 20)  SpO2: 94% (14 Dec 2024 06:00) (94% - 98%)    Parameters below as of 14 Dec 2024 06:00  Patient On (Oxygen Delivery Method): room air        PHYSICAL EXAM:  GENERAL: NAD  HEENT:  anicteric, moist mucous membranes  CHEST/LUNG:  CTA b/l, no rales, wheezes, or rhonchi  HEART:  RRR, S1, S2  ABDOMEN:  BS+, soft, nontender, nondistended  EXTREMITIES: no edema, cyanosis, or calf tenderness  NERVOUS SYSTEM: answers questions and follows commands appropriately    LABS:                        8.3    x     )-----------( x        ( 13 Dec 2024 20:20 )             26.5     CBC Full  -  ( 13 Dec 2024 20:20 )  WBC Count : x  Hemoglobin : 8.3 g/dL  Hematocrit : 26.5 %  Platelet Count - Automated : x  Mean Cell Volume : x  Mean Cell Hemoglobin : x  Mean Cell Hemoglobin Concentration : x  Auto Neutrophil # : x  Auto Lymphocyte # : x  Auto Monocyte # : x  Auto Eosinophil # : x  Auto Basophil # : x  Auto Neutrophil % : x  Auto Lymphocyte % : x  Auto Monocyte % : x  Auto Eosinophil % : x  Auto Basophil % : x      Ca    9.7        13 Dec 2024 07:40      PT/INR - ( 12 Dec 2024 16:52 )   PT: 13.6 sec;   INR: 1.15 ratio         PTT - ( 12 Dec 2024 16:52 )  PTT:29.9 sec  Urinalysis Basic - ( 13 Dec 2024 07:40 )    Color: x / Appearance: x / SG: x / pH: x  Gluc: 100 mg/dL / Ketone: x  / Bili: x / Urobili: x   Blood: x / Protein: x / Nitrite: x   Leuk Esterase: x / RBC: x / WBC x   Sq Epi: x / Non Sq Epi: x / Bacteria: x      CAPILLARY BLOOD GLUCOSE    Consultant(s) Notes Reviewed:  [x] YES  [ ] NO    
All interim records and events noted.    comfortable sitting up in bed      MEDICATIONS  (STANDING):  amLODIPine   Tablet 10 milliGRAM(s) Oral daily  cholecalciferol 1000 Unit(s) Oral daily  influenza  Vaccine (HIGH DOSE) 0.5 milliLiter(s) IntraMuscular once  iron sucrose IVPB 100 milliGRAM(s) IV Intermittent every 24 hours  lisinopril 20 milliGRAM(s) Oral daily  pantoprazole  Injectable 40 milliGRAM(s) IV Push two times a day  rosuvastatin 20 milliGRAM(s) Oral at bedtime  venlafaxine XR. 150 milliGRAM(s) Oral daily    MEDICATIONS  (PRN):  acetaminophen     Tablet .. 650 milliGRAM(s) Oral every 6 hours PRN Temp greater or equal to 38C (100.4F), Mild Pain (1 - 3)  aluminum hydroxide/magnesium hydroxide/simethicone Suspension 30 milliLiter(s) Oral every 4 hours PRN Dyspepsia  melatonin 3 milliGRAM(s) Oral at bedtime PRN Insomnia  ondansetron Injectable 4 milliGRAM(s) IV Push every 8 hours PRN Nausea and/or Vomiting      Vital Signs Last 24 Hrs  T(C): 37.4 (15 Dec 2024 12:51), Max: 37.4 (15 Dec 2024 12:51)  T(F): 99.4 (15 Dec 2024 12:51), Max: 99.4 (15 Dec 2024 12:51)  HR: 73 (15 Dec 2024 12:51) (73 - 77)  BP: 143/67 (15 Dec 2024 12:51) (143/67 - 154/62)  BP(mean): --  RR: 18 (15 Dec 2024 12:51) (18 - 18)  SpO2: 92% (15 Dec 2024 12:51) (92% - 95%)    Parameters below as of 15 Dec 2024 12:51  Patient On (Oxygen Delivery Method): room air        PHYSICAL EXAM  General: in no acute distress  Head: atraumatic, normocephalic  ENT: sclera anicteric, buccal mucosa moist  Neck: supple, trachea midline  CV: S1 S2, regular rate and rhythm  Lungs: clear to auscultation, no wheezes/rhonchi  Abdomen: soft, nontender, bowel sounds present, no palpable masses. Pouchy  Extrem: no clubbing/cyanosis/edema  Skin: no significant increased ecchymosis/petechiae  Neuro: alert and oriented X3,  no focal deficits      LABS:             7.8    10.10 )-----------( 354      ( 12-15 @ 09:51 )             25.0                8.9    13.89 )-----------( 429      ( 12-14 @ 10:19 )             28.5                8.3    x     )-----------( x        ( 12-13 @ 20:20 )             26.5                8.1    x     )-----------( x        ( 12-13 @ 14:30 )             26.1                7.6    10.05 )-----------( 351      ( 12-13 @ 07:40 )             24.6                5.3    10.44 )-----------( 397      ( 12-12 @ 16:52 )             18.3       12-15    141  |  109[H]  |  19  ----------------------------<  92  3.9   |  26  |  0.79    Ca    9.2      15 Dec 2024 09:51    TPro  7.0  /  Alb  3.5  /  TBili  0.2  /  DBili  x   /  AST  11[L]  /  ALT  16  /  AlkPhos  56  12-15    12-12 @ 16:52  PT13.6 INR1.15  PTT29.9      RADIOLOGY & ADDITIONAL STUDIES:    IMPRESSION/RECOMMENDATIONS:
[INTERVAL HX: ]  Patient seen and examined;  Chart reviewed and events noted;     denies further dark stools.     discussed UGIB sx  discussed GI follow up for colonoscopy    [MEDICATIONS]  MEDICATIONS  (STANDING):  amLODIPine   Tablet 10 milliGRAM(s) Oral daily  cholecalciferol 1000 Unit(s) Oral daily  influenza  Vaccine (HIGH DOSE) 0.5 milliLiter(s) IntraMuscular once  iron sucrose IVPB 100 milliGRAM(s) IV Intermittent every 24 hours  lisinopril 20 milliGRAM(s) Oral daily  pantoprazole  Injectable 40 milliGRAM(s) IV Push two times a day  rosuvastatin 20 milliGRAM(s) Oral at bedtime  venlafaxine XR. 150 milliGRAM(s) Oral daily    MEDICATIONS  (PRN):  acetaminophen     Tablet .. 650 milliGRAM(s) Oral every 6 hours PRN Temp greater or equal to 38C (100.4F), Mild Pain (1 - 3)  aluminum hydroxide/magnesium hydroxide/simethicone Suspension 30 milliLiter(s) Oral every 4 hours PRN Dyspepsia  melatonin 3 milliGRAM(s) Oral at bedtime PRN Insomnia  ondansetron Injectable 4 milliGRAM(s) IV Push every 8 hours PRN Nausea and/or Vomiting      [VITALS]  Vital Signs Last 24 Hrs  T(C): 37.2 (16 Dec 2024 12:20), Max: 37.2 (16 Dec 2024 11:10)  T(F): 99 (16 Dec 2024 12:20), Max: 99 (16 Dec 2024 12:20)  HR: 66 (16 Dec 2024 12:20) (66 - 88)  BP: 115/43 (16 Dec 2024 12:20) (115/43 - 159/61)  BP(mean): --  RR: 17 (16 Dec 2024 12:20) (17 - 18)  SpO2: 97% (16 Dec 2024 12:20) (92% - 99%)    Parameters below as of 16 Dec 2024 12:20  Patient On (Oxygen Delivery Method): room air      [WT/HT]  Daily     Daily Weight in k.8 (16 Dec 2024 05:06)  [VENT]      [PHYSICAL EXAM]  GEN: NAD  HEENT: normocephalic and atraumatic. EOMI. PERRL.    NECK: Supple.  No lymphadenopathy   LUNGS: Clear to auscultation.  HEART: Regular rate and rhythm,  no MRG  ABDOMEN: Soft, nontender, and nondistended.  Positive bowel sounds.    : No CVA tenderness  EXTREMITIES: Without edema.  NEUROLOGIC: grossly intact.  PSYCHIATRIC: Appropriate affect .  SKIN: No rash     [LABS:]                        8.0    11.62 )-----------( 306      ( 16 Dec 2024 08:25 )             26.4     12-    142  |  111[H]  |  20  ----------------------------<  98  4.7   |  26  |  0.86    Ca    9.7      16 Dec 2024 08:25    TPro  7.0  /  Alb  3.5  /  TBili  0.2  /  DBili  x   /  AST  11[L]  /  ALT  16  /  AlkPhos  56  12-15          Iron - Total Binding Capacity.: 460 ug/dL *H* [220 - 430] (24 @ 10:42)    Ferritin: 8 ng/mL *L* [13 - 330] (24 @ 10:42)    Vitamin B12, Serum: 718 pg/mL [232 - 1245] (24 @ 10:42)    Folate, Serum: >20.0 ng/mL (24 @ 10:42)    Ferritin: 11 ng/mL *L* [13 - 330] (24 @ 07:40)    Iron - Total Binding Capacity.: 448 ug/dL *H* [220 - 430] (24 @ 07:40)    Iron - Total Binding Capacity.: 515 ug/dL *H* [220 - 430] (23 @ 11:30)    Ferritin: 10 ng/mL *L* [13 - 330] (23 @ 11:30)    Reticulocyte Count (23 @ 11:30)  Absolute Reticulocytes: 77.6 K/uL [25.0 - 125.0]  Reticulocyte Percent: 2.5 % [0.5 - 2.5]      Urinalysis Basic - ( 16 Dec 2024 08:25 )    Color: x / Appearance: x / SG: x / pH: x  Gluc: 98 mg/dL / Ketone: x  / Bili: x / Urobili: x   Blood: x / Protein: x / Nitrite: x   Leuk Esterase: x / RBC: x / WBC x   Sq Epi: x / Non Sq Epi: x / Bacteria: x                [RADIOLOGY STUDIES:]    
All interim records and events noted.    comfortable  does not want to stay for Monday colonoscopy, also still believes her black stool is from tomatoes, spinach, food related, does not have change of cloth, food not what she wanted--discussed w pt that she had severe dorp in Hgb from 11 to 5 in 2 month necessity emergency admission and tx, also recurrent iron def anemia x at least one year and did not follow through w GI as outpatient, needs to get done while here for definitive dx and safety, concern for active source.      MEDICATIONS  (STANDING):  amLODIPine   Tablet 10 milliGRAM(s) Oral daily  cholecalciferol 1000 Unit(s) Oral daily  influenza  Vaccine (HIGH DOSE) 0.5 milliLiter(s) IntraMuscular once  lisinopril 20 milliGRAM(s) Oral daily  pantoprazole  Injectable 40 milliGRAM(s) IV Push two times a day  rosuvastatin 20 milliGRAM(s) Oral at bedtime  venlafaxine XR. 150 milliGRAM(s) Oral daily    MEDICATIONS  (PRN):  acetaminophen     Tablet .. 650 milliGRAM(s) Oral every 6 hours PRN Temp greater or equal to 38C (100.4F), Mild Pain (1 - 3)  aluminum hydroxide/magnesium hydroxide/simethicone Suspension 30 milliLiter(s) Oral every 4 hours PRN Dyspepsia  melatonin 3 milliGRAM(s) Oral at bedtime PRN Insomnia  ondansetron Injectable 4 milliGRAM(s) IV Push every 8 hours PRN Nausea and/or Vomiting      Vital Signs Last 24 Hrs  T(C): 36.3 (14 Dec 2024 12:25), Max: 36.5 (13 Dec 2024 19:52)  T(F): 97.4 (14 Dec 2024 12:25), Max: 97.7 (13 Dec 2024 19:52)  HR: 75 (14 Dec 2024 12:25) (63 - 79)  BP: 129/56 (14 Dec 2024 12:25) (125/74 - 164/56)  BP(mean): --  RR: 18 (14 Dec 2024 12:25) (18 - 20)  SpO2: 94% (14 Dec 2024 12:25) (94% - 98%)    Parameters below as of 14 Dec 2024 12:25  Patient On (Oxygen Delivery Method): room air        PHYSICAL EXAM  General: in no acute distress  Head: atraumatic, normocephalic  ENT: sclera anicteric, buccal mucosa moist  Neck: supple, trachea midline, no palpable masses  CV: S1 S2, regular rate and rhythm  Lungs: clear to auscultation, no wheezes/rhonchi  Abdomen: soft, nontender, bowel sounds present, no palpable masses  Extrem: no clubbing/cyanosis/edema  Skin: no significant increased ecchymosis/petechiae  Neuro: alert and oriented X3,  no focal deficits      LABS:  Iron with Total Binding Capacity (12.13.24 @ 10:42)   % Saturation, Iron: 5 %  Iron - Total Binding Capacity.: 460 ug/dL  Iron Total: 21 ug/dL  Unsaturated Iron Binding Capacity: 439 ug/dL    Ferritin (12.13.24 @ 10:42)   Ferritin: 8 ng/mL    Vitamin B12, Serum (12.13.24 @ 10:42)   Vitamin B12, Serum: 718 pg/mLFolate, Serum (12.13.24 @ 10:42)   Folate, Serum: >20.0 ng/mL                       8.9    13.89 )-----------( 429      ( 12-14 @ 10:19 )             28.5                8.3    x     )-----------( x        ( 12-13 @ 20:20 )             26.5                8.1    x     )-----------( x        ( 12-13 @ 14:30 )             26.1                7.6    10.05 )-----------( 351      ( 12-13 @ 07:40 )             24.6                5.3    10.44 )-----------( 397      ( 12-12 @ 16:52 )             18.3       12-14    142  |  109[H]  |  16  ----------------------------<  102[H]  3.7   |  25  |  0.90    Ca    9.7      14 Dec 2024 10:19  Mg     2.1     12-12    TPro  7.9  /  Alb  3.8  /  TBili  0.2  /  DBili  x   /  AST  11[L]  /  ALT  18  /  AlkPhos  66  12-14 12-12 @ 16:52  PT13.6 INR1.15  PTT29.9      RADIOLOGY & ADDITIONAL STUDIES:    IMPRESSION/RECOMMENDATIONS:
Patient is a 84y old  Female who presents with a chief complaint of anemia (13 Dec 2024 11:38)    INTERVAL HPI/OVERNIGHT EVENTS: No acute overnight events. Pt seen and examined at the bedside this AM. She states that she feels well, denies dizziness, HA, CP, SOB, abd pain. Last BM was yesterday at 3 pm, was dark colored. NO BM overnight. She has no complaints.     RN states pt was coughing when taking medications this afternoon.     MEDICATIONS  (STANDING):  amLODIPine   Tablet 10 milliGRAM(s) Oral daily  influenza  Vaccine (HIGH DOSE) 0.5 milliLiter(s) IntraMuscular once  lisinopril 20 milliGRAM(s) Oral daily  pantoprazole  Injectable 40 milliGRAM(s) IV Push two times a day  rosuvastatin 20 milliGRAM(s) Oral at bedtime  venlafaxine XR. 150 milliGRAM(s) Oral daily    MEDICATIONS  (PRN):  acetaminophen     Tablet .. 650 milliGRAM(s) Oral every 6 hours PRN Temp greater or equal to 38C (100.4F), Mild Pain (1 - 3)  aluminum hydroxide/magnesium hydroxide/simethicone Suspension 30 milliLiter(s) Oral every 4 hours PRN Dyspepsia  melatonin 3 milliGRAM(s) Oral at bedtime PRN Insomnia  ondansetron Injectable 4 milliGRAM(s) IV Push every 8 hours PRN Nausea and/or Vomiting      Allergies    penicillin (Hives)  clindamycin (Hives)  sulfa drugs (Hives)    Intolerances        REVIEW OF SYSTEMS:  CONSTITUTIONAL: No fever or chills  HEENT:  No headache  RESPIRATORY: No cough, wheezing, or shortness of breath  CARDIOVASCULAR: No chest pain, palpitations  GASTROINTESTINAL: No abd pain, nausea, vomiting, or diarrhea  GENITOURINARY: No dysuria, frequency, or hematuria  NEUROLOGICAL: no focal weakness or dizziness  MUSCULOSKELETAL: no myalgias       PHYSICAL EXAM:  CONSTITUTIONAL: awake, alert, no acute distress  HEENT: Atraumatic normocephalic. Moist mucous membranes.    RESP: No respiratory distress; CTA b/l, no WRR  CV: RRR, +S1S2, no MRG  GI: Bowel sounds present. Soft, nontender, nondistended, no rebound or guarding  MSK: Moving all four extremities spontaneously. No peripheral edema. No calf tenderness.  SKIN: Warm and dry. No rashes noted.  NEURO: AOx3, answering questions and following commands appropriately  PSYCH: Mood and affect appropriate, recent memory intact     Vital Signs Last 24 Hrs  T(C): 36.8 (13 Dec 2024 11:43), Max: 37.2 (12 Dec 2024 20:10)  T(F): 98.3 (13 Dec 2024 11:43), Max: 98.9 (12 Dec 2024 20:10)  HR: 67 (13 Dec 2024 11:43) (67 - 85)  BP: 158/58 (13 Dec 2024 11:43) (123/50 - 158/71)  BP(mean): 86 (12 Dec 2024 20:25) (86 - 86)  RR: 18 (13 Dec 2024 11:43) (17 - 19)  SpO2: 97% (13 Dec 2024 11:43) (93% - 99%)    Parameters below as of 13 Dec 2024 11:43  Patient On (Oxygen Delivery Method): room air        LABS:                        7.6    10.05 )-----------( 351      ( 13 Dec 2024 07:40 )             24.6     CBC Full  -  ( 13 Dec 2024 07:40 )  WBC Count : 10.05 K/uL  Hemoglobin : 7.6 g/dL  Hematocrit : 24.6 %  Platelet Count - Automated : 351 K/uL  Mean Cell Volume : 80.7 fl  Mean Cell Hemoglobin : 24.9 pg  Mean Cell Hemoglobin Concentration : 30.9 g/dL  Auto Neutrophil # : 6.87 K/uL  Auto Lymphocyte # : 1.79 K/uL  Auto Monocyte # : 0.96 K/uL  Auto Eosinophil # : 0.33 K/uL  Auto Basophil # : 0.05 K/uL  Auto Neutrophil % : 68.3 %  Auto Lymphocyte % : 17.8 %  Auto Monocyte % : 9.6 %  Auto Eosinophil % : 3.3 %  Auto Basophil % : 0.5 %    13 Dec 2024 07:40    143    |  112    |  15     ----------------------------<  100    5.0     |  29     |  0.97     Ca    9.7        13 Dec 2024 07:40  Mg     2.1       12 Dec 2024 16:52    TPro  7.0    /  Alb  3.5    /  TBili  0.6    /  DBili  x      /  AST  20     /  ALT  17     /  AlkPhos  61     13 Dec 2024 07:40    PT/INR - ( 12 Dec 2024 16:52 )   PT: 13.6 sec;   INR: 1.15 ratio         PTT - ( 12 Dec 2024 16:52 )  PTT:29.9 sec  Urinalysis Basic - ( 13 Dec 2024 07:40 )    Color: x / Appearance: x / SG: x / pH: x  Gluc: 100 mg/dL / Ketone: x  / Bili: x / Urobili: x   Blood: x / Protein: x / Nitrite: x   Leuk Esterase: x / RBC: x / WBC x   Sq Epi: x / Non Sq Epi: x / Bacteria: x      CAPILLARY BLOOD GLUCOSE              RADIOLOGY & ADDITIONAL TESTS:  Personally reviewed.     Consultant(s) Notes Reviewed:  [x] YES  [ ] NO
Patient is a 84y old  Female who presents with a chief complaint of anemia (15 Dec 2024 10:53)    INTERVAL HPI/OVERNIGHT EVENTS: No acute overnight events. Pt seen and examined at the bedside this AM. She states that she feels well, denies HA, dizziness, CP, SOB. She states she had a BM yesterday which was yellow colored liquid stool while she was on liquid diet.    MEDICATIONS  (STANDING):  amLODIPine   Tablet 10 milliGRAM(s) Oral daily  cholecalciferol 1000 Unit(s) Oral daily  influenza  Vaccine (HIGH DOSE) 0.5 milliLiter(s) IntraMuscular once  iron sucrose IVPB 100 milliGRAM(s) IV Intermittent every 24 hours  lisinopril 20 milliGRAM(s) Oral daily  pantoprazole  Injectable 40 milliGRAM(s) IV Push two times a day  rosuvastatin 20 milliGRAM(s) Oral at bedtime  venlafaxine XR. 150 milliGRAM(s) Oral daily    MEDICATIONS  (PRN):  acetaminophen     Tablet .. 650 milliGRAM(s) Oral every 6 hours PRN Temp greater or equal to 38C (100.4F), Mild Pain (1 - 3)  aluminum hydroxide/magnesium hydroxide/simethicone Suspension 30 milliLiter(s) Oral every 4 hours PRN Dyspepsia  melatonin 3 milliGRAM(s) Oral at bedtime PRN Insomnia  ondansetron Injectable 4 milliGRAM(s) IV Push every 8 hours PRN Nausea and/or Vomiting      Allergies    penicillin (Hives)  clindamycin (Hives)  sulfa drugs (Hives)    Intolerances        REVIEW OF SYSTEMS:  CONSTITUTIONAL: No fever or chills  HEENT:  No headache  RESPIRATORY: No cough, wheezing, or shortness of breath  CARDIOVASCULAR: No chest pain, palpitations  GASTROINTESTINAL: No abd pain, nausea, vomiting, or diarrhea  GENITOURINARY: No dysuria, frequency, or hematuria  NEUROLOGICAL: no focal weakness or dizziness  MUSCULOSKELETAL: no myalgias       PHYSICAL EXAM:  GENERAL: NAD, awake, alert  HEENT:  anicteric, moist mucous membranes  CHEST/LUNG:  CTA b/l, no rales, wheezes, or rhonchi  HEART:  RRR, S1, S2  ABDOMEN:  BS+, soft, nontender, nondistended  EXTREMITIES: no edema, cyanosis, or calf tenderness  NERVOUS SYSTEM: answers questions and follows commands appropriately    Vital Signs Last 24 Hrs  T(C): 36.6 (15 Dec 2024 04:09), Max: 36.8 (14 Dec 2024 20:21)  T(F): 97.9 (15 Dec 2024 04:09), Max: 98.2 (14 Dec 2024 20:21)  HR: 77 (15 Dec 2024 04:09) (75 - 77)  BP: 145/65 (15 Dec 2024 04:09) (129/56 - 154/62)  BP(mean): --  RR: 18 (15 Dec 2024 04:09) (18 - 18)  SpO2: 95% (15 Dec 2024 04:09) (93% - 95%)    Parameters below as of 15 Dec 2024 04:09  Patient On (Oxygen Delivery Method): room air        LABS:    CBC Full  -  ( 14 Dec 2024 10:19 )  WBC Count : 13.89 K/uL  Hemoglobin : 8.9 g/dL  Hematocrit : 28.5 %  Platelet Count - Automated : 429 K/uL  Mean Cell Volume : 80.7 fl  Mean Cell Hemoglobin : 25.2 pg  Mean Cell Hemoglobin Concentration : 31.2 g/dL  Auto Neutrophil # : 9.52 K/uL  Auto Lymphocyte # : 2.60 K/uL  Auto Monocyte # : 1.07 K/uL  Auto Eosinophil # : 0.54 K/uL  Auto Basophil # : 0.07 K/uL  Auto Neutrophil % : 68.6 %  Auto Lymphocyte % : 18.7 %  Auto Monocyte % : 7.7 %  Auto Eosinophil % : 3.9 %  Auto Basophil % : 0.5 %      Ca    9.7        14 Dec 2024 10:19        Urinalysis Basic - ( 14 Dec 2024 10:19 )    Color: x / Appearance: x / SG: x / pH: x  Gluc: 102 mg/dL / Ketone: x  / Bili: x / Urobili: x   Blood: x / Protein: x / Nitrite: x   Leuk Esterase: x / RBC: x / WBC x   Sq Epi: x / Non Sq Epi: x / Bacteria: x      CAPILLARY BLOOD GLUCOSE              RADIOLOGY & ADDITIONAL TESTS:  Personally reviewed.     Consultant(s) Notes Reviewed:  [x] YES  [ ] NO
Patient is a 84y old  Female who presents with a chief complaint of anemia (15 Dec 2024 13:54)      INTERVAL HPI/OVERNIGHT EVENTS: Patient seen and examined at bedside. Patient here for low hemoglobin in ER, denies any symptoms such as shortness of breath. Patient had BM yesterday that did not have blood, did not look black per pt. Scheduled for endoscopy today with Dr. Lawson 2pm. Denies fevers, chills, headache, lightheadedness, chest pain, dyspnea, abdominal pain, n/v/d/c.    MEDICATIONS  (STANDING):  amLODIPine   Tablet 10 milliGRAM(s) Oral daily  cholecalciferol 1000 Unit(s) Oral daily  influenza  Vaccine (HIGH DOSE) 0.5 milliLiter(s) IntraMuscular once  iron sucrose IVPB 100 milliGRAM(s) IV Intermittent every 24 hours  lisinopril 20 milliGRAM(s) Oral daily  pantoprazole  Injectable 40 milliGRAM(s) IV Push two times a day  rosuvastatin 20 milliGRAM(s) Oral at bedtime  venlafaxine XR. 150 milliGRAM(s) Oral daily    MEDICATIONS  (PRN):  acetaminophen     Tablet .. 650 milliGRAM(s) Oral every 6 hours PRN Temp greater or equal to 38C (100.4F), Mild Pain (1 - 3)  aluminum hydroxide/magnesium hydroxide/simethicone Suspension 30 milliLiter(s) Oral every 4 hours PRN Dyspepsia  melatonin 3 milliGRAM(s) Oral at bedtime PRN Insomnia  ondansetron Injectable 4 milliGRAM(s) IV Push every 8 hours PRN Nausea and/or Vomiting      Allergies    penicillin (Hives)  clindamycin (Hives)  sulfa drugs (Hives)    Intolerances        REVIEW OF SYSTEMS:  CONSTITUTIONAL: No fever or chills  HEENT:  No headache  RESPIRATORY: No cough, or shortness of breath  CARDIOVASCULAR: No chest pain, palpitations  GASTROINTESTINAL: No abd pain, nausea, vomiting, or diarrhea  GENITOURINARY: No dysuria  NEUROLOGICAL: no focal weakness or dizziness  MUSCULOSKELETAL: no myalgias     Vital Signs Last 24 Hrs  T(C): 36.8 (16 Dec 2024 05:06), Max: 37.4 (15 Dec 2024 12:51)  T(F): 98.2 (16 Dec 2024 05:06), Max: 99.4 (15 Dec 2024 12:51)  HR: 69 (16 Dec 2024 05:06) (69 - 77)  BP: 151/67 (16 Dec 2024 05:06) (143/67 - 159/61)  BP(mean): --  RR: 18 (16 Dec 2024 05:06) (18 - 18)  SpO2: 92% (16 Dec 2024 05:06) (92% - 94%)    Parameters below as of 16 Dec 2024 05:06  Patient On (Oxygen Delivery Method): room air        PHYSICAL EXAM:  GENERAL: NAD  HEENT:  anicteric, moist mucous membranes  CHEST/LUNG:  CTA b/l  HEART:  RRR, S1, S2  ABDOMEN:  BS+, soft, nontender  EXTREMITIES: no edema b/l  NERVOUS SYSTEM: answers questions and follows commands appropriately    LABS:                        8.0    11.62 )-----------( 306      ( 16 Dec 2024 08:25 )             26.4     CBC Full  -  ( 16 Dec 2024 08:25 )  WBC Count : 11.62 K/uL  Hemoglobin : 8.0 g/dL  Hematocrit : 26.4 %  Platelet Count - Automated : 306 K/uL  Mean Cell Volume : 81.2 fl  Mean Cell Hemoglobin : 24.6 pg  Mean Cell Hemoglobin Concentration : 30.3 g/dL  Auto Neutrophil # : x  Auto Lymphocyte # : x  Auto Monocyte # : x  Auto Eosinophil # : x  Auto Basophil # : x  Auto Neutrophil % : x  Auto Lymphocyte % : x  Auto Monocyte % : x  Auto Eosinophil % : x  Auto Basophil % : x    15 Dec 2024 09:51    141    |  109    |  19     ----------------------------<  92     3.9     |  26     |  0.79     Ca    9.2        15 Dec 2024 09:51    TPro  7.0    /  Alb  3.5    /  TBili  0.2    /  DBili  x      /  AST  11     /  ALT  16     /  AlkPhos  56     15 Dec 2024 09:51      Urinalysis Basic - ( 15 Dec 2024 09:51 )    Color: x / Appearance: x / SG: x / pH: x  Gluc: 92 mg/dL / Ketone: x  / Bili: x / Urobili: x   Blood: x / Protein: x / Nitrite: x   Leuk Esterase: x / RBC: x / WBC x   Sq Epi: x / Non Sq Epi: x / Bacteria: x      CAPILLARY BLOOD GLUCOSE              RADIOLOGY & ADDITIONAL TESTS:    Personally reviewed.     Consultant(s) Notes Reviewed:  [x] YES  [ ] NO

## 2024-12-16 NOTE — DIETITIAN INITIAL EVALUATION ADULT - OTHER INFO
Reason for Admission: anemia  History of Present Illness:   84 year old woman with PMH of iron deficiency, HTN, HLD, and depression, who presents to the ED for a low hemoglobin. Patient had a routine appt with her hematologist today. Outpatient labs were done and he was found to have a hemoglobin of 5.2. Was sent to the ED by her hematologist . She reports fatigue, SHEEHAN, and recently had some dark colored stools as well.  Patient has been feeling fatigued and SOB with exertion for the past 3 weeks. Normally able to ambulate without any issue/SOB, but recently has been SOB after walking 50 feet.  Reports she has episodes of black stool without any bright red blood; last dark BM was 3-4 days ago. States ~30% of her BMs are black every week. Had a BM today which she reports was normal in color. Pt attributes the dark BMs to her diet, which is high in tomatoes, spinach, and salads. Denies A/C use. Has never had a blood transfusion in the past. Previously in June pt had a black bowel movement, with bright red blood in the stool. Pt attributed this to drinking a large amount of tomato soup. Pt went to see her PCP and was found to have low iron and was given 2 doses of  Feraheme. Saw hematologist and was started on PO iron and then switched to iron infusions every 6 months, which was then increased to every 3 months, her last iron infusion was about 3 months ago. She was advised to see GI, but pt never did. Her last colonoscopy was 9 years ago, which she reports was normal.   weight # states weight 147# with recent weight loss over 2 weeks  speech recommends regular thin liquids

## 2024-12-16 NOTE — PROGRESS NOTE ADULT - PROBLEM SELECTOR PLAN 3
chronic   - c/w home rosuvastatin 10 mg qhs

## 2024-12-16 NOTE — DIETITIAN INITIAL EVALUATION ADULT - ORAL INTAKE PTA/DIET HISTORY
patient seen in bed. back from EGD. patient reports PO improved now. seen eating lunch. reports for 2 weeks PTA lower PO had stopped taking tomatoes and coffee with GI concerns. no food allergies. no difficulties chewing swallowing   patient educated on GERD diet verbal /written at this time

## 2024-12-16 NOTE — DISCHARGE NOTE NURSING/CASE MANAGEMENT/SOCIAL WORK - FINANCIAL ASSISTANCE
[facility] provides services at a reduced cost to those who are determined to be eligible through [facility]’s financial assistance program. Information regarding [facility]’s financial assistance program can be found by going to [email or web address] or by calling [phone].

## 2024-12-16 NOTE — DIETITIAN INITIAL EVALUATION ADULT - PERTINENT LABORATORY DATA
12-16    142  |  111[H]  |  20  ----------------------------<  98  4.7   |  26  |  0.86    Ca    9.7      16 Dec 2024 08:25    TPro  7.0  /  Alb  3.5  /  TBili  0.2  /  DBili  x   /  AST  11[L]  /  ALT  16  /  AlkPhos  56  12-15

## 2024-12-16 NOTE — DIETITIAN INITIAL EVALUATION ADULT - PROBLEM SELECTOR PLAN 1
pt w/ SHEEHAN and fatigue, hx of iron deficiency on iron infusions   - Hgb 5.3 FOBT + on admission; s/p protonix 40 IV x 1 and 2U pRBCs in ED  - AM iron panel ordered  - monitor daily CBC  - maintain active type+screen   - c/w Protonix 40 mg IV bid  - clear liquid diet  - GI consulted, Dr. Lawson, f/u recs  - Heme consulted, Dr. Parikh, f/u recs

## 2024-12-16 NOTE — PROGRESS NOTE ADULT - PROVIDER SPECIALTY LIST ADULT
Gastroenterology
Heme/Onc
Gastroenterology
Hospitalist
Heme/Onc
Heme/Onc
Hospitalist

## 2024-12-16 NOTE — PROGRESS NOTE ADULT - ASSESSMENT
83 yo woman follows in our office since 10/2023 for iron deficiency anemia good response to IV iron(Ferahme), last dose 9/2024 and CBC 10/2024r Hgb 11.6 mcv 93.8, but when seen in office 12/12, hgb 5.2 wbc 11.14 plts 417 mcv 79.7 and was sent to ER  Pt reports intermittent black stool but attributed to her food intake of spinach  and tomatoes, has been instructed to see GI but has not  Admits to 3wks incr SOB, SHEEHAN, fatigue  had episode 6/2024 w black stool as wwell as blood in stool  No abdomen pain  CBC in ER 12/12 wbc 10.44 hgb 5.3 hct 18.3 mcv 78.9 plts 397 rbc 2.32  Transfused 2U PRBC, feeling improved  Stool occult blood positive  12/13 hgb 8.1  Seen by GI    -recurrent irn deficiency anemia, now adm w precipitous drop of Hgb from Hgb 11s 2months ago to 5s, melena hx, occult positive stool on adm  -post tx 2u PRBC w appropriate resonse, Hgb sl lower today than yesterday but similar to previous day, continue serial montior  -for EGD on tomorrow  -iron studies here c/w again sig iron deficiency. B12 and foalte adequate. Post D#1 iron sucrose 100mg yesterday , toelragted well, fot D#2 today. discussed w pt  -cont follow serial CBC      
IMPRESSION    83 yo woman follows in our office since 10/2023 for iron deficiency anemia good response to IV Feraheme last dose 9/2024 and CBC 10/2024r Hgb 11.6 mcv 93.8,   When seen in office 12/12, hgb 5.2 wbc 11.14 plts 417 mcv 79.7 and was sent to ER  Pt reports intermittent black stool but attributed to her food intake of spinach  and tomatoes, has been instructed to see GI but has not  Admits to 3wks incr SOB, SHEEHAN, fatigue  had episode 6/2024 w black stool as wwell as blood in stool  No abdomen pain  CBC in ER 12/12 wbc 10.44 hgb 5.3 hct 18.3 mcv 78.9 plts 397 rbc 2.32  Transfused 2U PRBC, feeling improved  Stool occult blood positive  12/13 hgb 8.1  Seen by GI    -recurrent irn deficiency anemia, now adm w precipitous drop of Hgb from Hgb 11s 2months ago to 5s, melena hx, occult positive stool on adm  -post tx 2u PRBC w appropriate response Hgb sl lower today than yesterday but similar to previous day, continue serial monitor  -s/p EGD today  iron studies here c/w again sig iron deficiency. B12 and foalte adequate. Post D#1 iron sucrose 100mg yesterday , tolerated well, fot D#2 today. Discussed w pt    RECOMMENDATIONS  Cont IV Venofer  Outpt followup in office in 1-2wks  Pt to call to make appt    outpt GI followup for need for repeat EGD and for colonoscopy  -cont follow serial CBC    d/w pt  d/w Dr Banuelos      
85 yo woman follows in our office since 10/2023 for iron deficiency anemia good response to IV iron(Ferahme), last dose 9/2024 and CBC 10/2024r Hgb 11.6 mcv 93.8, but when seen in office 12/12, hgb 5.2 wbc 11.14 plts 417 mcv 79.7 and was sent to ER  Pt reports intermittent black stool but attributed to her food intake of spinach  and tomatoes, has been instructed to see GI but has not  Admits to 3wks incr SOB, SHEEHAN, fatigue  had episode 6/2024 w black stool as wwell as blood in stool  No abdomen pain  CBC in ER 12/12 wbc 10.44 hgb 5.3 hct 18.3 mcv 78.9 plts 397 rbc 2.32  Transfused 2U PRBC, feeling improved  Stool occult blood positive  12/13 hgb 8.1    -recurrent irn deficiency anemia, now adm w precipitous drop of Hgb from Hgb 11s 2months ago to 5s, melena hx, occulat positive stool on adm  -post tx 2u PRBC w appropriate resonse  -seen by GI, scheduled for EGD on Monday--discussed w pt importance of having the procedure(see CC above)  -iron studies here c/w again sig iron deficiency. b12 and foalte adequate. Will start IV iron  -follow serial CBC      
Anemia  FOBT+  Iron deficiency    CBC noted  Hgb improving s/p transfusions  PPI BID  Monitor for any overt GIB symptoms  Last colonoscopy 9 years ago wnl  Scheduled for egd Monday  D/w patient, at this time she is unsure if she prefers inpatient or outpatient procedure  Will follow up    I reviewed the overnight course of events on the unit, re-confirming the patient history. I discussed the care with the patient  Differential diagnosis and plan of care discussed with patient after the evaluation  35 minutes spent on total encounter of which more than fifty percent of the encounter was spent counseling and/or coordinating care by the attending physician.
Anemia  FOBT+  Iron deficiency    CBC noted  Hgb improving s/p transfusions  PPI BID  Monitor for any overt GIB symptoms  Last colonoscopy 9 years ago wnl  Scheduled for egd Monday  NPO after midnight  Will follow up    I reviewed the overnight course of events on the unit, re-confirming the patient history. I discussed the care with the patient  Differential diagnosis and plan of care discussed with patient after the evaluation  35 minutes spent on total encounter of which more than fifty percent of the encounter was spent counseling and/or coordinating care by the attending physician.
84 year old woman with PMH of iron deficiency, HTN, HLD, and depression, who presents to the ED for a low hemoglobin, admitted for symptomatic anemia and GI bleed. 

## 2024-12-16 NOTE — DIETITIAN INITIAL EVALUATION ADULT - PERTINENT MEDS FT
MEDICATIONS  (STANDING):  amLODIPine   Tablet 10 milliGRAM(s) Oral daily  cholecalciferol 1000 Unit(s) Oral daily  influenza  Vaccine (HIGH DOSE) 0.5 milliLiter(s) IntraMuscular once  iron sucrose IVPB 100 milliGRAM(s) IV Intermittent every 24 hours  lisinopril 20 milliGRAM(s) Oral daily  pantoprazole  Injectable 40 milliGRAM(s) IV Push two times a day  rosuvastatin 20 milliGRAM(s) Oral at bedtime  venlafaxine XR. 150 milliGRAM(s) Oral daily    MEDICATIONS  (PRN):  acetaminophen     Tablet .. 650 milliGRAM(s) Oral every 6 hours PRN Temp greater or equal to 38C (100.4F), Mild Pain (1 - 3)  aluminum hydroxide/magnesium hydroxide/simethicone Suspension 30 milliLiter(s) Oral every 4 hours PRN Dyspepsia  melatonin 3 milliGRAM(s) Oral at bedtime PRN Insomnia  ondansetron Injectable 4 milliGRAM(s) IV Push every 8 hours PRN Nausea and/or Vomiting

## 2024-12-16 NOTE — CASE MANAGEMENT PROGRESS NOTE - NSCMPROGRESSNOTE_GEN_ALL_CORE
Per MD patient is stable for transition home today. No skilled needs identified. Discharge Notice reviewed and explained to patient. Pt verbalized understanding, pt is in agreement with transitioning home today. Pt stated that she drove herself here  and wants to drive herself home. CM discussed that she cannot drive herself home as she had anesthesia for her EGD procedure so she  cannot be allowed to drive herself home today. Pt agreed for CM to arrange for Taxi to take her home. Primary care RN and ANM provided with information [employer], # [phone] to arrange for Taxi to transport patient home when she is ready for discharge.

## 2024-12-16 NOTE — DIETITIAN INITIAL EVALUATION ADULT - PROBLEM SELECTOR PLAN 2
chronic  - c/w home amlodipine 10 mg qd w/ hold parameters  - c/w home lisinopril 20 mg qd  w/ hold parameters  - monitor routine hemodynamics

## 2024-12-16 NOTE — DISCHARGE NOTE NURSING/CASE MANAGEMENT/SOCIAL WORK - PATIENT PORTAL LINK FT
You can access the FollowMyHealth Patient Portal offered by [facility] by registering at the following website: [email or web address]. By joining [employer]’s FollowMyHealth portal, you will also be able to view your health information using other applications (apps) compatible with our system.

## 2024-12-16 NOTE — PROGRESS NOTE ADULT - PROBLEM SELECTOR PLAN 1
pt w/ SHEEHAN and fatigue, hx of iron deficiency on iron infusions   - Hgb 5.3 FOBT + on admission; s/p protonix 40 IV x 1 and 2U pRBCs in ED  - Hgb improved 8.0 this AM  - Total iron 21, iron deficiency   - started on IV iron  - maintain active type+screen   - c/w Protonix 40 mg IV bid  - GI consulted, Dr. Lawson, f/u recs, plan for endoscopy today, NPO until procedure today  - Heme consulted, Dr. Parikh, f/u recs

## 2025-06-13 ENCOUNTER — OUTPATIENT (OUTPATIENT)
Dept: OUTPATIENT SERVICES | Facility: HOSPITAL | Age: 85
LOS: 1 days | End: 2025-06-13
Payer: MEDICARE

## 2025-06-13 DIAGNOSIS — D50.8 OTHER IRON DEFICIENCY ANEMIAS: ICD-10-CM

## 2025-06-13 PROBLEM — E78.5 HYPERLIPIDEMIA, UNSPECIFIED: Chronic | Status: ACTIVE | Noted: 2024-12-12

## 2025-06-13 PROBLEM — Z86.59 PERSONAL HISTORY OF OTHER MENTAL AND BEHAVIORAL DISORDERS: Chronic | Status: ACTIVE | Noted: 2024-12-12

## 2025-06-13 PROBLEM — Z86.39 PERSONAL HISTORY OF OTHER ENDOCRINE, NUTRITIONAL AND METABOLIC DISEASE: Chronic | Status: ACTIVE | Noted: 2024-12-12

## 2025-06-13 LAB
BLD GP AB SCN SERPL QL: SIGNIFICANT CHANGE UP
HCT VFR BLD CALC: 25.4 % — LOW (ref 34.5–45)
HGB BLD-MCNC: 7.8 G/DL — LOW (ref 11.5–15.5)
MCHC RBC-ENTMCNC: 26.7 PG — LOW (ref 27–34)
MCHC RBC-ENTMCNC: 30.7 G/DL — LOW (ref 32–36)
MCV RBC AUTO: 87 FL — SIGNIFICANT CHANGE UP (ref 80–100)
NRBC BLD AUTO-RTO: 0 /100 WBCS — SIGNIFICANT CHANGE UP (ref 0–0)
PLATELET # BLD AUTO: 324 K/UL — SIGNIFICANT CHANGE UP (ref 150–400)
RBC # BLD: 2.92 M/UL — LOW (ref 3.8–5.2)
RBC # FLD: 13.6 % — SIGNIFICANT CHANGE UP (ref 10.3–14.5)
WBC # BLD: 7.81 K/UL — SIGNIFICANT CHANGE UP (ref 3.8–10.5)
WBC # FLD AUTO: 7.81 K/UL — SIGNIFICANT CHANGE UP (ref 3.8–10.5)

## 2025-06-14 ENCOUNTER — OUTPATIENT (OUTPATIENT)
Dept: OUTPATIENT SERVICES | Facility: HOSPITAL | Age: 85
LOS: 1 days | End: 2025-06-14

## 2025-06-14 VITALS
SYSTOLIC BLOOD PRESSURE: 166 MMHG | HEART RATE: 88 BPM | TEMPERATURE: 98 F | RESPIRATION RATE: 18 BRPM | OXYGEN SATURATION: 97 % | DIASTOLIC BLOOD PRESSURE: 64 MMHG

## 2025-06-14 VITALS
OXYGEN SATURATION: 96 % | TEMPERATURE: 98 F | SYSTOLIC BLOOD PRESSURE: 131 MMHG | RESPIRATION RATE: 18 BRPM | DIASTOLIC BLOOD PRESSURE: 60 MMHG | HEART RATE: 74 BPM

## 2025-06-14 DIAGNOSIS — D50.8 OTHER IRON DEFICIENCY ANEMIAS: ICD-10-CM

## 2025-06-14 PROCEDURE — 86923 COMPATIBILITY TEST ELECTRIC: CPT

## 2025-06-14 PROCEDURE — 36430 TRANSFUSION BLD/BLD COMPNT: CPT

## 2025-06-14 PROCEDURE — 86901 BLOOD TYPING SEROLOGIC RH(D): CPT

## 2025-06-14 PROCEDURE — 36415 COLL VENOUS BLD VENIPUNCTURE: CPT

## 2025-06-14 PROCEDURE — 85027 COMPLETE CBC AUTOMATED: CPT

## 2025-06-14 PROCEDURE — 86900 BLOOD TYPING SEROLOGIC ABO: CPT

## 2025-06-14 PROCEDURE — 86850 RBC ANTIBODY SCREEN: CPT

## 2025-06-14 PROCEDURE — P9016: CPT

## 2025-06-14 RX ORDER — DIPHENHYDRAMINE HCL 12.5MG/5ML
25 ELIXIR ORAL ONCE
Refills: 0 | Status: COMPLETED | OUTPATIENT
Start: 2025-06-14 | End: 2025-06-14

## 2025-06-14 RX ORDER — ACETAMINOPHEN 500 MG/5ML
650 LIQUID (ML) ORAL ONCE
Refills: 0 | Status: COMPLETED | OUTPATIENT
Start: 2025-06-14 | End: 2025-06-14

## 2025-06-14 RX ADMIN — Medication 650 MILLIGRAM(S): at 10:15

## 2025-06-14 RX ADMIN — Medication 650 MILLIGRAM(S): at 10:00

## 2025-06-14 RX ADMIN — Medication 25 MILLIGRAM(S): at 10:00

## 2025-06-23 ENCOUNTER — APPOINTMENT (OUTPATIENT)
Dept: CT IMAGING | Facility: CLINIC | Age: 85
End: 2025-06-23

## 2025-06-23 ENCOUNTER — OUTPATIENT (OUTPATIENT)
Dept: OUTPATIENT SERVICES | Facility: HOSPITAL | Age: 85
LOS: 1 days | End: 2025-06-23
Payer: MEDICARE

## 2025-06-23 DIAGNOSIS — Z00.8 ENCOUNTER FOR OTHER GENERAL EXAMINATION: ICD-10-CM

## 2025-06-23 PROCEDURE — 74261 CT COLONOGRAPHY DX: CPT | Mod: 26

## 2025-06-23 PROCEDURE — 74261 CT COLONOGRAPHY DX: CPT

## (undated) DEVICE — MARKER ENDO SPOT EX

## (undated) DEVICE — SENSOR O2 FINGER XL ADULT 24/BX 6BX/CA

## (undated) DEVICE — TUBING CANNULA SALTER LABS NASAL ADULT 7FT

## (undated) DEVICE — SET IV PUMP BLOOD 1VALVE 180FILTER NON-DEHP

## (undated) DEVICE — CATH ELCTR GLIDE PRB 7FR

## (undated) DEVICE — TUBING IV SET SECOND 34" W/O LOK-BLUNT

## (undated) DEVICE — CATH IV SAFE BC 20G X 1.16" (PINK)

## (undated) DEVICE — BRUSH COLONOSCOPY CYTOLOGY

## (undated) DEVICE — BRUSH CYTO ENDO

## (undated) DEVICE — SOL IRR NS 0.9% 250ML

## (undated) DEVICE — NDL INJ SCLERO INTERJECT 23G

## (undated) DEVICE — TUBING IV SET GRAVITY 3Y 100" MACRO

## (undated) DEVICE — TUBING SUCTION CONN 6FT STERILE

## (undated) DEVICE — SUCTION YANKAUER TAPERED BULBOUS NO VENT

## (undated) DEVICE — ELCTR GROUNDING PAD ADULT COVIDIEN

## (undated) DEVICE — SOL IRR POUR H2O 500ML

## (undated) DEVICE — CATH IV SAFE BC 22G X 1" (BLUE)

## (undated) DEVICE — SYR IV POSIFLUSH NS 3ML 30/TY

## (undated) DEVICE — BITE BLOCK MAXI RUBBER STAMP

## (undated) DEVICE — GLV 8 PROTEXIS (WHITE)

## (undated) DEVICE — STERIS DEFENDO 3-PIECE KIT (AIR/WATER, SUCTION & BIOPSY VALVES)

## (undated) DEVICE — DRSG CURITY GAUZE SPONGE 4 X 4" 12-PLY

## (undated) DEVICE — CATH ELECHMSTAT  INJ 7FR 210CM

## (undated) DEVICE — SOL IRR POUR NS 0.9% 1000ML

## (undated) DEVICE — SNARE LRG

## (undated) DEVICE — SOL IRR BAG H2O 1000ML

## (undated) DEVICE — FORMALIN CUPS 10% BUFFERED

## (undated) DEVICE — SOL IRR POUR H2O 1000ML

## (undated) DEVICE — SYR LUER LOK 30CC

## (undated) DEVICE — Device

## (undated) DEVICE — FORCEP RADIAL JAW 4 W NDL 2.2MM 2.8MM 160CM YELLOW DISP

## (undated) DEVICE — TUBE O2 SUPL CRUSH RESIS CONN SOUTHSIDE ONLY